# Patient Record
Sex: FEMALE | Race: WHITE | Employment: OTHER | URBAN - METROPOLITAN AREA
[De-identification: names, ages, dates, MRNs, and addresses within clinical notes are randomized per-mention and may not be internally consistent; named-entity substitution may affect disease eponyms.]

---

## 2019-12-11 ENCOUNTER — HOSPITAL ENCOUNTER (OUTPATIENT)
Dept: GENERAL RADIOLOGY | Age: 59
Discharge: HOME | End: 2019-12-11
Payer: COMMERCIAL

## 2019-12-11 DIAGNOSIS — Z78.0 ASYMPTOMATIC POSTMENOPAUSAL STATE: ICD-10-CM

## 2019-12-11 DIAGNOSIS — M54.2 CERVICALGIA: ICD-10-CM

## 2019-12-11 PROCEDURE — 77080 DXA BONE DENSITY AXIAL: CPT

## 2019-12-11 PROCEDURE — 72040 X-RAY EXAM NECK SPINE 2-3 VW: CPT

## 2021-09-29 ENCOUNTER — HOSPITAL ENCOUNTER (OUTPATIENT)
Dept: GENERAL RADIOLOGY | Age: 61
Discharge: HOME | End: 2021-09-29
Payer: COMMERCIAL

## 2021-09-29 ENCOUNTER — HOSPITAL ENCOUNTER (OUTPATIENT)
Dept: MAMMOGRAPHY | Age: 61
Discharge: HOME | End: 2021-09-29
Payer: COMMERCIAL

## 2021-09-29 VITALS — WEIGHT: 180 LBS | BODY MASS INDEX: 33.13 KG/M2 | HEIGHT: 62 IN

## 2021-09-29 DIAGNOSIS — M15.9 OSTEOARTHRITIS OF MULTIPLE JOINTS, UNSPECIFIED OSTEOARTHRITIS TYPE: ICD-10-CM

## 2021-09-29 DIAGNOSIS — Z12.31 ENCOUNTER FOR SCREENING MAMMOGRAM FOR BREAST CANCER: ICD-10-CM

## 2021-09-29 PROCEDURE — 77063 BREAST TOMOSYNTHESIS BI: CPT

## 2021-09-29 PROCEDURE — 73110 X-RAY EXAM OF WRIST: CPT

## 2022-02-08 ENCOUNTER — APPOINTMENT (OUTPATIENT)
Dept: CT IMAGING | Age: 62
End: 2022-02-08
Payer: COMMERCIAL

## 2022-02-08 ENCOUNTER — HOSPITAL ENCOUNTER (EMERGENCY)
Age: 62
Discharge: HOME | End: 2022-02-08
Attending: EMERGENCY MEDICINE
Payer: COMMERCIAL

## 2022-02-08 VITALS
RESPIRATION RATE: 16 BRPM | DIASTOLIC BLOOD PRESSURE: 80 MMHG | WEIGHT: 175 LBS | SYSTOLIC BLOOD PRESSURE: 151 MMHG | OXYGEN SATURATION: 96 % | BODY MASS INDEX: 32.01 KG/M2 | TEMPERATURE: 98.3 F | HEART RATE: 93 BPM

## 2022-02-08 DIAGNOSIS — S22.42XA CLOSED FRACTURE OF MULTIPLE RIBS OF LEFT SIDE, INITIAL ENCOUNTER: Primary | ICD-10-CM

## 2022-02-08 LAB
ANION GAP SERPL CALCULATED.3IONS-SCNC: 13 MMOL/L (ref 3–16)
BASOPHILS ABSOLUTE: 0.1 K/UL (ref 0–0.2)
BASOPHILS RELATIVE PERCENT: 1.1 %
BILIRUBIN URINE: NEGATIVE
BLOOD, URINE: NEGATIVE
BUN BLDV-MCNC: 21 MG/DL (ref 7–20)
CALCIUM SERPL-MCNC: 9.1 MG/DL (ref 8.3–10.6)
CHLORIDE BLD-SCNC: 100 MMOL/L (ref 99–110)
CLARITY: CLEAR
CO2: 26 MMOL/L (ref 21–32)
COLOR: YELLOW
CREAT SERPL-MCNC: <0.5 MG/DL (ref 0.6–1.2)
EOSINOPHILS ABSOLUTE: 0.1 K/UL (ref 0–0.6)
EOSINOPHILS RELATIVE PERCENT: 1.7 %
GFR AFRICAN AMERICAN: >60
GFR NON-AFRICAN AMERICAN: >60
GLUCOSE BLD-MCNC: 114 MG/DL (ref 70–99)
GLUCOSE URINE: NEGATIVE MG/DL
HCT VFR BLD CALC: 36.8 % (ref 36–48)
HEMOGLOBIN: 11.9 G/DL (ref 12–16)
KETONES, URINE: NEGATIVE MG/DL
LEUKOCYTE ESTERASE, URINE: NEGATIVE
LYMPHOCYTES ABSOLUTE: 1.4 K/UL (ref 1–5.1)
LYMPHOCYTES RELATIVE PERCENT: 18.5 %
MCH RBC QN AUTO: 27.5 PG (ref 26–34)
MCHC RBC AUTO-ENTMCNC: 32.4 G/DL (ref 31–36)
MCV RBC AUTO: 84.8 FL (ref 80–100)
MICROSCOPIC EXAMINATION: NORMAL
MONOCYTES ABSOLUTE: 0.7 K/UL (ref 0–1.3)
MONOCYTES RELATIVE PERCENT: 8.7 %
NEUTROPHILS ABSOLUTE: 5.4 K/UL (ref 1.7–7.7)
NEUTROPHILS RELATIVE PERCENT: 70 %
NITRITE, URINE: NEGATIVE
PDW BLD-RTO: 13.7 % (ref 12.4–15.4)
PH UA: 5.5 (ref 5–8)
PLATELET # BLD: 367 K/UL (ref 135–450)
PMV BLD AUTO: 6.7 FL (ref 5–10.5)
POTASSIUM REFLEX MAGNESIUM: 4.1 MMOL/L (ref 3.5–5.1)
PROTEIN UA: NEGATIVE MG/DL
RBC # BLD: 4.34 M/UL (ref 4–5.2)
REASON FOR REJECTION: NORMAL
REJECTED TEST: NORMAL
SODIUM BLD-SCNC: 139 MMOL/L (ref 136–145)
SPECIFIC GRAVITY UA: 1.03 (ref 1–1.03)
TROPONIN: <0.01 NG/ML
URINE REFLEX TO CULTURE: NORMAL
URINE TYPE: NORMAL
UROBILINOGEN, URINE: 0.2 E.U./DL
WBC # BLD: 7.7 K/UL (ref 4–11)

## 2022-02-08 PROCEDURE — 80048 BASIC METABOLIC PNL TOTAL CA: CPT

## 2022-02-08 PROCEDURE — 93005 ELECTROCARDIOGRAM TRACING: CPT | Performed by: EMERGENCY MEDICINE

## 2022-02-08 PROCEDURE — 6360000004 HC RX CONTRAST MEDICATION: Performed by: EMERGENCY MEDICINE

## 2022-02-08 PROCEDURE — 84484 ASSAY OF TROPONIN QUANT: CPT

## 2022-02-08 PROCEDURE — 6370000000 HC RX 637 (ALT 250 FOR IP): Performed by: EMERGENCY MEDICINE

## 2022-02-08 PROCEDURE — 85025 COMPLETE CBC W/AUTO DIFF WBC: CPT

## 2022-02-08 PROCEDURE — 36415 COLL VENOUS BLD VENIPUNCTURE: CPT

## 2022-02-08 PROCEDURE — 81003 URINALYSIS AUTO W/O SCOPE: CPT

## 2022-02-08 PROCEDURE — 74177 CT ABD & PELVIS W/CONTRAST: CPT

## 2022-02-08 PROCEDURE — 71260 CT THORAX DX C+: CPT

## 2022-02-08 PROCEDURE — 99283 EMERGENCY DEPT VISIT LOW MDM: CPT

## 2022-02-08 RX ORDER — ONDANSETRON 4 MG/1
4 TABLET, ORALLY DISINTEGRATING ORAL ONCE
Status: COMPLETED | OUTPATIENT
Start: 2022-02-08 | End: 2022-02-08

## 2022-02-08 RX ORDER — HYDROXYCHLOROQUINE SULFATE 200 MG/1
TABLET, FILM COATED ORAL DAILY
COMMUNITY

## 2022-02-08 RX ORDER — ONDANSETRON 4 MG/1
4 TABLET, FILM COATED ORAL DAILY PRN
Qty: 30 TABLET | Refills: 0 | Status: SHIPPED | OUTPATIENT
Start: 2022-02-08 | End: 2022-02-24 | Stop reason: SDUPTHER

## 2022-02-08 RX ORDER — OXYCODONE HYDROCHLORIDE AND ACETAMINOPHEN 5; 325 MG/1; MG/1
1 TABLET ORAL ONCE
Status: COMPLETED | OUTPATIENT
Start: 2022-02-08 | End: 2022-02-08

## 2022-02-08 RX ORDER — DULAGLUTIDE 0.75 MG/.5ML
1.5 INJECTION, SOLUTION SUBCUTANEOUS WEEKLY
COMMUNITY
End: 2022-03-30

## 2022-02-08 RX ORDER — DULOXETIN HYDROCHLORIDE 60 MG/1
60 CAPSULE, DELAYED RELEASE ORAL DAILY
COMMUNITY
End: 2022-09-12 | Stop reason: SDUPTHER

## 2022-02-08 RX ORDER — ONDANSETRON HYDROCHLORIDE 8 MG/1
8 TABLET, FILM COATED ORAL EVERY 8 HOURS PRN
COMMUNITY
End: 2022-02-24

## 2022-02-08 RX ORDER — HYDROCODONE BITARTRATE AND ACETAMINOPHEN 5; 325 MG/1; MG/1
1 TABLET ORAL EVERY 4 HOURS PRN
Qty: 18 TABLET | Refills: 0 | Status: SHIPPED | OUTPATIENT
Start: 2022-02-08 | End: 2022-02-11

## 2022-02-08 RX ORDER — PRAVASTATIN SODIUM 10 MG
10 TABLET ORAL DAILY
COMMUNITY
End: 2022-09-12 | Stop reason: SDUPTHER

## 2022-02-08 RX ORDER — MELOXICAM 7.5 MG/1
7.5 TABLET ORAL DAILY
Qty: 90 TABLET | Refills: 1 | Status: SHIPPED | OUTPATIENT
Start: 2022-02-08 | End: 2022-03-30

## 2022-02-08 RX ORDER — GABAPENTIN 300 MG/1
300 CAPSULE ORAL 2 TIMES DAILY
COMMUNITY
End: 2022-09-12 | Stop reason: SDUPTHER

## 2022-02-08 RX ORDER — GLIPIZIDE 10 MG/1
10 TABLET ORAL
COMMUNITY
End: 2022-03-30 | Stop reason: SDUPTHER

## 2022-02-08 RX ADMIN — OXYCODONE AND ACETAMINOPHEN 1 TABLET: 5; 325 TABLET ORAL at 07:11

## 2022-02-08 RX ADMIN — ONDANSETRON 4 MG: 4 TABLET, ORALLY DISINTEGRATING ORAL at 07:11

## 2022-02-08 RX ADMIN — IOPAMIDOL 75 ML: 755 INJECTION, SOLUTION INTRAVENOUS at 09:11

## 2022-02-08 ASSESSMENT — PAIN SCALES - GENERAL
PAINLEVEL_OUTOF10: 10
PAINLEVEL_OUTOF10: 10

## 2022-02-08 NOTE — ED NOTES
Discharge instructions and prescriptions reviewed with pt. Pt allowed opportunity to ask questions and verbalized understanding.       Mandi Taveras RN  02/08/22 0722 Pt placed in radiant warmer, temp set to 36.5 celsius.

## 2022-02-08 NOTE — ED NOTES
Bed: 09  Expected date:   Expected time:   Means of arrival:   Comments:  Shiva Caldera RN  02/08/22 48

## 2022-02-08 NOTE — ED PROVIDER NOTES
Emergency Department Encounter    Patient: Guerda Collado  MRN: [id number]  : 1960  Date of Evaluation: 2022  ED Provider:  Elias Mullen MD    Triage Chief Complaint:   Back Pain (pt states since  she has been sick with coughing. pt states hurts to take a deep breath. pt states hurts to move. )    [place]:  Guerda Collado is a 57 y.o. female that presents history of pleuritic pain left, was Covid negative antibiotic use of Augmentin, no steroids, utilization of inhalers has history of diabetes arthritis and fibromyalgia no known history of coronary disease. Severe left lateral chest wall pain possible mild trauma after walking her dog, but complained of severe cough and pleuritic chest pain left. Pain with range of motion. Left upper quadrant pain.     ROS - see HPI, below listed is current ROS at time of my eval:  General:  No fevers, no weakness  Eyes:  no discharge  ENT:  No sore throat, no nasal congestion  Cardiovascular:  + chest pain, no palpitations  Respiratory:  No shortness of breath, no cough, no wheezing  Gastrointestinal:  No pain, no nausea, no vomiting, no diarrhea  Musculoskeletal:  No muscle pain, no joint pain  Skin:  No rash, no pruritis  Neurologic:  No speech problems, no headache, no extremity numbness, no extremity tingling, no extremity weakness  Psychiatric:  No anxiety  Genitourinary:  No dysuria, no hematuria  Endocrine:  No unexpected weight gain, no unexpected weight loss  Extremities:  no edema, no pain    Past Medical History:   Diagnosis Date    Arthritis     Asthma     Diabetes mellitus ([facility] [address]     Diverticulitis     Fibromyalgia     GERD (gastroesophageal reflux disease)     IBS (irritable bowel syndrome)      Past Surgical History:   Procedure Laterality Date    COLONOSCOPY      HYSTERECTOMY      age 39    LAPAROSCOPY      OVARY REMOVAL      age 43     Family History   Problem Relation Age of Onset    Breast Cancer Paternal Aunt     Breast Cancer Other     Ovarian Cancer Other     Ovarian Cancer Other     Ovarian Cancer Other     Ovarian Cancer Other      Social History     Socioeconomic History    Marital status:      Spouse name: Not on file    Number of children: Not on file    Years of education: Not on file    Highest education level: Not on file   Occupational History    Not on file   Tobacco Use    Smoking status: Never Smoker    Smokeless tobacco: Never Used   Substance and Sexual Activity    Alcohol use: Yes     Comment: rare    Drug use: No    Sexual activity: Not on file   Other Topics Concern    Not on file   Social History Narrative    Not on file     Social Determinants of Health     Financial Resource Strain:     Difficulty of Paying Living Expenses: Not on file   Food Insecurity:     Worried About Running Out of Food in the Last Year: Not on file    Adrienne of Food in the Last Year: Not on file   Transportation Needs:     Lack of Transportation (Medical): Not on file    Lack of Transportation (Non-Medical):  Not on file   Physical Activity:     Days of Exercise per Week: Not on file    Minutes of Exercise per Session: Not on file   Stress:     Feeling of Stress : Not on file   Social Connections:     Frequency of Communication with Friends and Family: Not on file    Frequency of Social Gatherings with Friends and Family: Not on file    Attends Taoist Services: Not on file    Active Member of [address] [place] [address] or Organizations: Not on file    Attends Club or Organization Meetings: Not on file    Marital Status: Not on file   Intimate Partner Violence:     Fear of Current or Ex-Partner: Not on file    Emotionally Abused: Not on file    Physically Abused: Not on file    Sexually Abused: Not on file   Housing Stability:     Unable to Pay for Housing in the Last Year: Not on file    Number of [place] in the Last Year: Not on file    Unstable Housing in the Last Year: Not on file     No current facility-administered medications for this encounter. Current Outpatient Medications   Medication Sig Dispense Refill    Dulaglutide (TRULICITY) 3.02 CI/6.6GN SOPN Inject 0.75 mg into the skin once a week      gabapentin (NEURONTIN) 300 MG capsule Take 300 mg by mouth 3 times daily.       glipiZIDE (GLUCOTROL) 10 MG tablet Take 10 mg by mouth 2 times daily (before meals)      DULoxetine (CYMBALTA) 60 MG extended release capsule Take 60 mg by mouth daily      ondansetron (ZOFRAN) 8 MG tablet Take 8 mg by mouth every 8 hours as needed for Nausea or Vomiting      hydroxychloroquine (PLAQUENIL) 200 MG tablet Take by mouth daily      pravastatin (PRAVACHOL) 10 MG tablet Take 10 mg by mouth daily      metFORMIN (GLUCOPHAGE-XR) 750 MG extended release tablet Take 750 mg by mouth 2 times daily      lisinopril (PRINIVIL;ZESTRIL) 20 MG tablet Take 20 mg by mouth daily ? dose      levothyroxine (SYNTHROID) 75 MCG tablet Take 75 mcg by mouth Daily      lansoprazole (PREVACID) 30 MG delayed release capsule Take 30 mg by mouth as needed      albuterol sulfate  (90 Base) MCG/ACT inhaler Inhale 2 puffs into the lungs every 6 hours as needed for Wheezing      budesonide-formoterol (SYMBICORT) 160-4.5 MCG/ACT AERO Inhale 2 puffs into the lungs 2 times daily      montelukast (SINGULAIR) 10 MG tablet Take 10 mg by mouth nightly      ibuprofen (ADVIL;MOTRIN) 800 MG tablet Take 1 tablet by mouth every 8 hours as needed for Pain or Fever 20 tablet 0     Allergies   Allergen Reactions    Latex      Gloves cause skin irritation and asthma       Nursing Notes Reviewed    Physical Exam:  Triage VS:    ED Triage Vitals   Enc Vitals Group      BP 02/08/22 0605 (!) 151/80      Pulse 02/08/22 0605 93      Resp 02/08/22 0605 16      Temp 02/08/22 0605 98.3 °F (36.8 °C)      Temp Source 02/08/22 0605 Oral      SpO2 02/08/22 0605 96 %      Weight 02/08/22 0608 175 lb (79.4 kg)      Height --       Head Circumference -- Peak Flow --       Pain Score --       Pain Loc --       Pain Edu? --       Excl. in [address]? --        My pulse ox interpretation is - normal    General appearance:  No acute distress. Skin:  Warm. Dry. Eye:  Extraocular movements intact. Ears, nose, mouth and throat:  Oral mucosa moist   Neck:  Trachea midline. Extremity:  No swelling. Normal ROM     Heart:  Regular rate and rhythm, normal S1 & S2, no extra heart sounds. Perfusion:  intact, left chest wall tenderness  Respiratory:  Lungs clear to auscultation bilaterally. Respirations nonlabored. Abdominal:  Normal bowel sounds. Soft. Nontender. Non distended. Neurological:  Alert and oriented times 3.              Psychiatric:  Appropriate    I have reviewed and interpreted all of the currently available lab results from this visit (if applicable):  Results for orders placed or performed during the hospital encounter of 02/08/22   CBC Auto Differential   Result Value Ref Range    WBC 7.7 4.0 - 11.0 K/uL    RBC 4.34 4.00 - 5.20 M/uL    Hemoglobin 11.9 (L) 12.0 - 16.0 g/dL    Hematocrit 36.8 36.0 - 48.0 %    MCV 84.8 80.0 - 100.0 fL    MCH 27.5 26.0 - 34.0 pg    MCHC 32.4 31.0 - 36.0 g/dL    RDW 13.7 12.4 - 15.4 %    Platelets 601 993 - 666 K/uL    MPV 6.7 5.0 - 10.5 fL    Neutrophils % 70.0 %    Lymphocytes % 18.5 %    Monocytes % 8.7 %    Eosinophils % 1.7 %    Basophils % 1.1 %    Neutrophils Absolute 5.4 1.7 - 7.7 K/uL    Lymphocytes Absolute 1.4 1.0 - 5.1 K/uL    Monocytes Absolute 0.7 0.0 - 1.3 K/uL    Eosinophils Absolute 0.1 0.0 - 0.6 K/uL    Basophils Absolute 0.1 0.0 - 0.2 K/uL   SPECIMEN REJECTION   Result Value Ref Range    Rejected Test bmpx,trop     Reason for Rejection see below    Basic Metabolic Panel w/ Reflex to MG   Result Value Ref Range    Sodium 139 136 - 145 mmol/L    Potassium reflex Magnesium 4.1 3.5 - 5.1 mmol/L    Chloride 100 99 - 110 mmol/L    CO2 26 21 - 32 mmol/L    Anion Gap 13 3 - 16    Glucose 114 (H) 70 - 99 mg/dL    BUN 21 (H) 7 - 20 mg/dL    CREATININE <0.5 (L) 0.6 - 1.2 mg/dL    GFR Non-African American >60 >60    GFR African American >60 >60    Calcium 9.1 8.3 - 10.6 mg/dL   Troponin   Result Value Ref Range    Troponin <0.01 <0.01 ng/mL   Urine, reflex to culture    Specimen: Urine, clean catch   Result Value Ref Range    Color, UA YELLOW Straw/Yellow    Clarity, UA Clear Clear    Glucose, Ur Negative Negative mg/dL    Bilirubin Urine Negative Negative    Ketones, Urine Negative Negative mg/dL    Specific Gravity, UA 1.027 1.005 - 1.030    Blood, Urine Negative Negative    pH, UA 5.5 5.0 - 8.0    Protein, UA Negative Negative mg/dL    Urobilinogen, Urine 0.2 <2.0 E.U./dL    Nitrite, Urine Negative Negative    Leukocyte Esterase, Urine Negative Negative    Microscopic Examination Not Indicated     Urine Type Voided     Urine Reflex to Culture Not Indicated    EKG 12 Lead   Result Value Ref Range    Ventricular Rate 77 BPM    Atrial Rate 77 BPM    P-R Interval 130 ms    QRS Duration 86 ms    Q-T Interval 400 ms    QTc Calculation (Bazett) 452 ms    P Axis 37 degrees    R Axis 14 degrees    T Axis 23 degrees    Diagnosis Normal sinus rhythmNormal ECG       Radiographs (if obtained):  Radiologist's Report Reviewed:  Fractures 8 9, no pneumothorax no hemothorax, resolving pneumonia and lymphadenopathy      EKG (if obtained): (All EKG's are interpreted by myself in the absence of a cardiologist)  Sinus rhythm, normal axis, normal intervals, no acute ST segment abnormalities    MDM:  Patient presented with chest pain. Given history and presentation cardiac workup initiated. Patient had labs, EKG, x-ray and troponin ordered. Patient was placed on monitor. Patient's pulse ox is normal.      HEART Score:   3    Patient's chest x-ray is read by radiology as negative for acute abnormality    The patient's initial troponin is within the normal range. Patient's EKG did not show any acute diagnostic ischemic changes.   The patient was given medications, is starting to feel better. Patient does not have any acute hemodynamic instability. I completed a HEART PATHWAY to screen for Acute Coronary Syndrome (ACS) in this patient. The evidence indicates that the patient is very low risk for ACS and this is consistent with my clinical intuition. The risk of further workup or hospitalization is likely higher than the risk of the patient having an ACS. It is, therefore, in the patients best interest not to do additional emergent testing or be hospitalized    Patient has rib fractures without evidence of pneumothorax or hemothorax, no solid organ injury no splenic injury. Analgesia activity as tolerated return if worse or new symptoms, resolving pneumonia without hypoxia. Clinical Impression:  1. Closed fracture of multiple ribs of left side, initial encounter      Disposition referral (if applicable):  Primary MD          Disposition medications (if applicable):  New Prescriptions    No medications on file       Comment: Please note this report has been produced using speech recognition software and may contain errors related to that system including errors in grammar, punctuation, and spelling, as well as words and phrases that may be inappropriate. Efforts were made to edit the dictations.       Hernan Ortiz MD  17/14/81 8403

## 2022-02-10 LAB
EKG ATRIAL RATE: 77 BPM
EKG DIAGNOSIS: NORMAL
EKG P AXIS: 37 DEGREES
EKG P-R INTERVAL: 130 MS
EKG Q-T INTERVAL: 400 MS
EKG QRS DURATION: 86 MS
EKG QTC CALCULATION (BAZETT): 452 MS
EKG R AXIS: 14 DEGREES
EKG T AXIS: 23 DEGREES
EKG VENTRICULAR RATE: 77 BPM

## 2022-02-10 PROCEDURE — 93010 ELECTROCARDIOGRAM REPORT: CPT | Performed by: INTERNAL MEDICINE

## 2022-02-24 ENCOUNTER — OFFICE VISIT (OUTPATIENT)
Dept: PRIMARY CARE CLINIC | Age: 62
End: 2022-02-24
Payer: COMMERCIAL

## 2022-02-24 VITALS
SYSTOLIC BLOOD PRESSURE: 115 MMHG | RESPIRATION RATE: 13 BRPM | HEART RATE: 103 BPM | OXYGEN SATURATION: 96 % | TEMPERATURE: 97.2 F | BODY MASS INDEX: 32.76 KG/M2 | HEIGHT: 62 IN | DIASTOLIC BLOOD PRESSURE: 75 MMHG | WEIGHT: 178 LBS

## 2022-02-24 DIAGNOSIS — R41.3 MEMORY PROBLEM: ICD-10-CM

## 2022-02-24 DIAGNOSIS — E11.9 TYPE 2 DIABETES MELLITUS WITHOUT COMPLICATION, WITHOUT LONG-TERM CURRENT USE OF INSULIN (HCC): Primary | ICD-10-CM

## 2022-02-24 DIAGNOSIS — I10 HYPERTENSION, ESSENTIAL: ICD-10-CM

## 2022-02-24 DIAGNOSIS — M79.7 FIBROMYALGIA: ICD-10-CM

## 2022-02-24 DIAGNOSIS — Z00.00 ROUTINE ADULT HEALTH MAINTENANCE: ICD-10-CM

## 2022-02-24 DIAGNOSIS — S22.42XD CLOSED FRACTURE OF MULTIPLE RIBS OF LEFT SIDE WITH ROUTINE HEALING, SUBSEQUENT ENCOUNTER: ICD-10-CM

## 2022-02-24 DIAGNOSIS — M72.0 DUPUYTREN CONTRACTURE: ICD-10-CM

## 2022-02-24 DIAGNOSIS — Z79.51 LONG TERM (CURRENT) USE OF INHALED STEROIDS: ICD-10-CM

## 2022-02-24 DIAGNOSIS — E78.2 MIXED HYPERLIPIDEMIA: ICD-10-CM

## 2022-02-24 DIAGNOSIS — Z13.820 OSTEOPOROSIS SCREENING: ICD-10-CM

## 2022-02-24 DIAGNOSIS — K58.0 IRRITABLE BOWEL SYNDROME WITH DIARRHEA: ICD-10-CM

## 2022-02-24 DIAGNOSIS — J45.30 MILD PERSISTENT ASTHMA, UNSPECIFIED WHETHER COMPLICATED: ICD-10-CM

## 2022-02-24 DIAGNOSIS — R19.7 DIARRHEA, UNSPECIFIED TYPE: ICD-10-CM

## 2022-02-24 DIAGNOSIS — E03.9 HYPOTHYROIDISM, UNSPECIFIED TYPE: ICD-10-CM

## 2022-02-24 DIAGNOSIS — R91.8 PULMONARY NODULES: ICD-10-CM

## 2022-02-24 PROCEDURE — 3044F HG A1C LEVEL LT 7.0%: CPT | Performed by: INTERNAL MEDICINE

## 2022-02-24 PROCEDURE — 3017F COLORECTAL CA SCREEN DOC REV: CPT | Performed by: INTERNAL MEDICINE

## 2022-02-24 PROCEDURE — G8484 FLU IMMUNIZE NO ADMIN: HCPCS | Performed by: INTERNAL MEDICINE

## 2022-02-24 PROCEDURE — G8427 DOCREV CUR MEDS BY ELIG CLIN: HCPCS | Performed by: INTERNAL MEDICINE

## 2022-02-24 PROCEDURE — 2022F DILAT RTA XM EVC RTNOPTHY: CPT | Performed by: INTERNAL MEDICINE

## 2022-02-24 PROCEDURE — 99203 OFFICE O/P NEW LOW 30 MIN: CPT | Performed by: INTERNAL MEDICINE

## 2022-02-24 PROCEDURE — G8417 CALC BMI ABV UP PARAM F/U: HCPCS | Performed by: INTERNAL MEDICINE

## 2022-02-24 PROCEDURE — 1036F TOBACCO NON-USER: CPT | Performed by: INTERNAL MEDICINE

## 2022-02-24 RX ORDER — LANCETS 33 GAUGE
EACH MISCELLANEOUS
COMMUNITY
Start: 2022-01-26

## 2022-02-24 RX ORDER — NYSTATIN 100000 U/G
CREAM TOPICAL PRN
COMMUNITY
Start: 2021-12-07 | End: 2022-03-30

## 2022-02-24 RX ORDER — ONDANSETRON HYDROCHLORIDE 8 MG/1
8 TABLET, FILM COATED ORAL DAILY PRN
Qty: 30 TABLET | Refills: 0 | Status: SHIPPED | OUTPATIENT
Start: 2022-02-24 | End: 2022-03-30

## 2022-02-24 RX ORDER — SUCRALFATE 1 G/1
1 TABLET ORAL 4 TIMES DAILY
Qty: 120 TABLET | Refills: 0 | Status: SHIPPED | OUTPATIENT
Start: 2022-02-24 | End: 2022-03-14

## 2022-02-24 RX ORDER — ERGOCALCIFEROL 1.25 MG/1
CAPSULE ORAL WEEKLY
COMMUNITY
Start: 2021-12-07 | End: 2022-06-01 | Stop reason: SDUPTHER

## 2022-02-24 RX ORDER — BLOOD SUGAR DIAGNOSTIC
STRIP MISCELLANEOUS
COMMUNITY
Start: 2022-01-21

## 2022-02-24 RX ORDER — FLUTICASONE PROPIONATE 50 MCG
SPRAY, SUSPENSION (ML) NASAL DAILY
COMMUNITY
Start: 2021-12-07 | End: 2022-09-12 | Stop reason: SDUPTHER

## 2022-02-24 RX ORDER — PANTOPRAZOLE SODIUM 40 MG/1
40 TABLET, DELAYED RELEASE ORAL
Qty: 90 TABLET | Refills: 1 | Status: SHIPPED | OUTPATIENT
Start: 2022-02-24 | End: 2022-09-12 | Stop reason: SDUPTHER

## 2022-02-24 SDOH — ECONOMIC STABILITY: FOOD INSECURITY: WITHIN THE PAST 12 MONTHS, YOU WORRIED THAT YOUR FOOD WOULD RUN OUT BEFORE YOU GOT MONEY TO BUY MORE.: NEVER TRUE

## 2022-02-24 SDOH — ECONOMIC STABILITY: FOOD INSECURITY: WITHIN THE PAST 12 MONTHS, THE FOOD YOU BOUGHT JUST DIDN'T LAST AND YOU DIDN'T HAVE MONEY TO GET MORE.: NEVER TRUE

## 2022-02-24 ASSESSMENT — SOCIAL DETERMINANTS OF HEALTH (SDOH): HOW HARD IS IT FOR YOU TO PAY FOR THE VERY BASICS LIKE FOOD, HOUSING, MEDICAL CARE, AND HEATING?: NOT HARD AT ALL

## 2022-02-24 ASSESSMENT — PATIENT HEALTH QUESTIONNAIRE - PHQ9
SUM OF ALL RESPONSES TO PHQ QUESTIONS 1-9: 2
2. FEELING DOWN, DEPRESSED OR HOPELESS: 1
1. LITTLE INTEREST OR PLEASURE IN DOING THINGS: 1
SUM OF ALL RESPONSES TO PHQ QUESTIONS 1-9: 2
SUM OF ALL RESPONSES TO PHQ QUESTIONS 1-9: 2
SUM OF ALL RESPONSES TO PHQ9 QUESTIONS 1 & 2: 2
SUM OF ALL RESPONSES TO PHQ QUESTIONS 1-9: 2

## 2022-02-24 NOTE — PATIENT INSTRUCTIONS
1. Stool tst  2. Trial carafate to help stomach knot  3. Fasting blood and urine test  4. DEXA scan  5.  Eye exam

## 2022-02-24 NOTE — PROGRESS NOTES
pain, sore throat and trouble swallowing. Eyes: Negative for pain and visual disturbance. Respiratory: Negative for apnea, cough, chest tightness, shortness of breath and wheezing. Cardiovascular: Negative for chest pain and palpitations. Gastrointestinal: Negative for abdominal pain, blood in stool, constipation,  nausea, rectal pain and vomiting. Endocrine: Negative for cold intolerance, heat intolerance, polydipsia, polyphagia and polyuria. Genitourinary: Negative for difficulty urinating, dysuria, flank pain, frequency, hematuria, pelvic pain, urgency, vaginal bleeding and vaginal discharge. Musculoskeletal: Negative for arthralgias, back pain, gait problem, joint swelling, myalgias, neck pain and neck stiffness. Skin: Negative for color change and rash. Neurological: Negative for dizziness, tremors, syncope, speech difficulty, weakness, light-headedness and headaches. Hematological: Negative for adenopathy. Does not bruise/bleed easily. Psychiatric/Behavioral: Negative for agitation, behavioral problems, decreased concentration, sleep disturbance and suicidal ideas. The patient is not nervous/anxious and is not hyperactive. Prior to Visit Medications    Medication Sig Taking?  Authorizing Provider   vitamin D (ERGOCALCIFEROL) 1.25 MG (03179 UT) CAPS capsule once a week Yes Historical Provider, MD   BREO ELLIPTA 200-25 MCG/INH AEPB inhaler INHALE 1 PUFF BY MOUTH ONCE A DAY Yes Historical Provider, MD   fluticasone (FLONASE) 50 MCG/ACT nasal spray daily Yes Historical Provider, MD   nystatin (MYCOSTATIN) 426482 UNIT/GM cream as needed Yes Historical Provider, MD   ONETOUCH ULTRA strip USE TO TEST BLOOD SUGAR TWICE DAILY Yes Historical Provider, MD Carranza Delica Lancets 50T MISC USE TO TEST BLOOD SUGAR TWICE DAILY Yes Historical Provider, MD   metFORMIN (GLUCOPHAGE) 1000 MG tablet 2 times daily (with meals) Yes Historical Provider, MD   pantoprazole (PROTONIX) 40 MG tablet Take 1 tablet by mouth every morning (before breakfast) Yes Cinthia Blackwood MD   ondansetron (ZOFRAN) 8 MG tablet Take 1 tablet by mouth daily as needed for Nausea or Vomiting Yes Cinthia Blackwood MD   sucralfate (CARAFATE) 1 GM tablet Take 1 tablet by mouth 4 times daily Yes Cinthia Blackwood MD   Dulaglutide (TRULICITY) 1.42 BJ/8.2EP SOPN Inject 1.5 mg into the skin once a week  Yes Historical Provider, MD   gabapentin (NEURONTIN) 300 MG capsule Take 300 mg by mouth 2 times daily.   Yes Historical Provider, MD   glipiZIDE (GLUCOTROL) 10 MG tablet Take 10 mg by mouth 2 times daily (before meals) Yes Historical Provider, MD   DULoxetine (CYMBALTA) 60 MG extended release capsule Take 60 mg by mouth daily Yes Historical Provider, MD   hydroxychloroquine (PLAQUENIL) 200 MG tablet Take by mouth daily Yes Historical Provider, MD   pravastatin (PRAVACHOL) 10 MG tablet Take 10 mg by mouth daily Yes Historical Provider, MD   meloxicam (MOBIC) 7.5 MG tablet Take 1 tablet by mouth daily Yes Estefanía Garcia MD   lisinopril (PRINIVIL;ZESTRIL) 20 MG tablet Take 20 mg by mouth daily ? dose Yes Historical Provider, MD   levothyroxine (SYNTHROID) 75 MCG tablet Take 75 mcg by mouth Daily Yes Historical Provider, MD   albuterol sulfate  (90 Base) MCG/ACT inhaler Inhale 2 puffs into the lungs every 6 hours as needed for Wheezing Yes Historical Provider, MD   montelukast (SINGULAIR) 10 MG tablet Take 10 mg by mouth nightly Yes Historical Provider, MD   lansoprazole (PREVACID) 30 MG delayed release capsule Take 30 mg by mouth as needed Yes Historical Provider, MD   ibuprofen (ADVIL;MOTRIN) 800 MG tablet Take 1 tablet by mouth every 8 hours as needed for Pain or Fever Yes ELMER Mcintyre CNP   budesonide-formoterol (SYMBICORT) 160-4.5 MCG/ACT AERO Inhale 2 puffs into the lungs 2 times daily  Patient not taking: Reported on 2/24/2022  Historical Provider, MD        Allergies   Allergen Reactions    Latex      Gloves cause skin irritation and asthma       Past Medical History:   Diagnosis Date    Arthritis     Asthma     Diabetes mellitus ([facility] [address]     Diverticulitis     Fibromyalgia     GERD (gastroesophageal reflux disease)     IBS (irritable bowel syndrome)        Past Surgical History:   Procedure Laterality Date    COLONOSCOPY      HYSTERECTOMY      age 39    LAPAROSCOPY      OVARY REMOVAL      age 43       Social History     Socioeconomic History    Marital status:      Spouse name: Not on file    Number of children: Not on file    Years of education: Not on file    Highest education level: Not on file   Occupational History    Not on file   Tobacco Use    Smoking status: Never Smoker    Smokeless tobacco: Never Used   Substance and Sexual Activity    Alcohol use: Yes     Comment: rare    Drug use: No    Sexual activity: Not on file   Other Topics Concern    Not on file   Social History Narrative    Not on file     Social Determinants of Health     Financial Resource Strain: Low Risk     Difficulty of Paying Living Expenses: Not hard at all   Food Insecurity: No Food Insecurity    Worried About Running Out of Food in the Last Year: Never true    Adrienne of Food in the Last Year: Never true   Transportation Needs:     Lack of Transportation (Medical): Not on file    Lack of Transportation (Non-Medical):  Not on file   Physical Activity:     Days of Exercise per Week: Not on file    Minutes of Exercise per Session: Not on file   Stress:     Feeling of Stress : Not on file   Social Connections:     Frequency of Communication with Friends and Family: Not on file    Frequency of Social Gatherings with Friends and Family: Not on file    Attends Mormon Services: Not on file    Active Member of Clubs or Organizations: Not on file    Attends Club or Organization Meetings: Not on file    Marital Status: Not on file   Intimate Partner Violence:     Fear of Current or Ex-Partner: Not on file   Jeni Hernandez Emotionally Abused: Not on file    Physically Abused: Not on file    Sexually Abused: Not on file   Housing Stability:     Unable to Pay for Housing in the Last Year: Not on file    Number of [place] in the Last Year: Not on file    Unstable Housing in the Last Year: Not on file        Family History   Problem Relation Age of Onset    Breast Cancer Paternal Aunt     Breast Cancer Other     Ovarian Cancer Other     Ovarian Cancer Other     Ovarian Cancer Other     Ovarian Cancer Other        Vitals:    02/24/22 1020   BP: 115/75   Site: Left Upper Arm   Position: Sitting   Cuff Size: Large Adult   Pulse: 103   Resp: 13   Temp: 97.2 °F (36.2 °C)   TempSrc: Temporal   SpO2: 96%   Weight: 178 lb (80.7 kg)   Height: 5' 2\" (1.575 m)     Estimated body mass index is 32.56 kg/m² as calculated from the following:    Height as of this encounter: 5' 2\" (1.575 m). Weight as of this encounter: 178 lb (80.7 kg). PHYSICAL EXAM  GENERAL:  Pleasant  female who looks her stated age, awake alert and oriented x3, no acute distress. LUNGS:  Clear to auscultation bilaterally. Excellent air entry. No inspiratory crackles or expiratory wheezes. No Pleural friction rub. HEART:  Regular rate and rhythm without pathologic murmur rub gallop S3 or S4. ABDOMEN:  Soft, nontender. Normal bowel sounds. No guarding. No masses. Extremities: Bilateral palmar Dupuytren contractures. No stocking paresthesia or diabetic foot ulcers. Adequate circulation 2+ pulses. Scant distal lower extremity edema. PSYCH:  No psychomotor retardation or agitation. Good eye contact. Unrestricted affect range. Mood congruent with affect. Garrulous, occasionally tangential thought.       LABS  Lab Review   Admission on 02/08/2022, Discharged on 02/08/2022   Component Date Value    Ventricular Rate 02/08/2022 77     Atrial Rate 02/08/2022 77     P-R Interval 02/08/2022 130     QRS Duration 02/08/2022 86     Q-T Interval lung exam reassuring. DEXA scan 2019 T score -1.0 spine, -0.5 worst hip. Though unlikely to see interval progression to osteoporosis, patient and rheumatology asking for updated DEXA scan, not unreasonable as it has been 3 years  - Vitamin D 25 Hydroxy; Future  - DEXA BONE DENSITY AXIAL SKELETON; Future    7. Osteoporosis screening  See #6 above  - Vitamin D 25 Hydroxy; Future  - DEXA BONE DENSITY AXIAL SKELETON; Future    8. Hypothyroidism, unspecified type  TSH 1.9 December 8, 2021. Confirm compliance and adequacy of replacement. No symptoms of over or under replacement other than worsening diarrhea, memory loss, for which other explanations exist.    - TSH with Reflex; Future    9. Mixed hyperlipidemia  Though not in patient chart, patient on low-dose pravastatin, suspect statin myalgia for concern for such given pre-existing fibromyalgia. - Lipid Panel; Future  - AST; Future  - ALT; Future    10. Routine adult health maintenance  Only [facility] primary series, check status Tdap COVID booster influenza Shingrix. Inquire as to date of last colonoscopy (after diverticulitis), mammogram, status post hysterectomy therefore no Pap.    - Hep C AB RLFX HCV PCR-A; Future  -Vit D    11. Body mass index (BMI) 32.0-32.9, adult   - Vitamin D 25 Hydroxy; Future    12. Long term (current) use of inhaled steroids   Not currently using Symbicort but has in the past.  - DEXA BONE DENSITY AXIAL SKELETON; Future    13. Memory problem  Garrel Essex on return. 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 Non-smoker, nondrinker. No apparent history of sleep apnea. Inquire regarding reason for MCFP, family history dementia    15. Fibromyalgia  Followed in rheumatology. Only briefly on Plaquenil. Do not have results of any rheumatology work-up other than normal sed rate December 2021.   Currently on Cymbalta 60 mg at bedtime by gabapentin 300 twice daily. 15. Pulmonary nodules  February 8, 2022 chest CT. Mild bilateral hilar lymphadenopathy, precarinal lymph node, patchy infiltrate left upper and lower lobes presumed remnant of prior pneumonia. NonSmoker, 3 to 6 mm nodules left  upper lobe, for which obtain follow-up CT in 3 months, May 2022, repeat in 18 months if indicated. 16. Dupuytren contracture  Seeing a hand surgeon. 17.  Dyspepsia. Chronic NSAID use. Discomfort despite prior PPI, no EtOH. No melena or solid food dysphagia. Not taking Mobic and ibuprofen concurrently. Asks for refill for Zofran. Wonder about H. pylori, Metformin and or Trulicity side effect. Restart PPI, add Carafate short trial.     ?EGD    Return in about 4 weeks (around 3/24/2022). It was a pleasure to visit with Ms. Allyson Quick today. Answered all questions as best I could.   Reyes Tabares MD   Time 40 minutes

## 2022-02-25 DIAGNOSIS — S22.42XD CLOSED FRACTURE OF MULTIPLE RIBS OF LEFT SIDE WITH ROUTINE HEALING, SUBSEQUENT ENCOUNTER: ICD-10-CM

## 2022-02-25 DIAGNOSIS — Z13.820 OSTEOPOROSIS SCREENING: ICD-10-CM

## 2022-02-25 DIAGNOSIS — E11.9 TYPE 2 DIABETES MELLITUS WITHOUT COMPLICATION, WITHOUT LONG-TERM CURRENT USE OF INSULIN (HCC): ICD-10-CM

## 2022-02-25 DIAGNOSIS — E78.2 MIXED HYPERLIPIDEMIA: ICD-10-CM

## 2022-02-25 DIAGNOSIS — I10 HYPERTENSION, ESSENTIAL: ICD-10-CM

## 2022-02-25 DIAGNOSIS — E03.9 HYPOTHYROIDISM, UNSPECIFIED TYPE: ICD-10-CM

## 2022-02-25 LAB
ALT SERPL-CCNC: 16 U/L (ref 10–40)
ANION GAP SERPL CALCULATED.3IONS-SCNC: 15 MMOL/L (ref 3–16)
AST SERPL-CCNC: 16 U/L (ref 15–37)
BUN BLDV-MCNC: 18 MG/DL (ref 7–20)
CALCIUM SERPL-MCNC: 9.3 MG/DL (ref 8.3–10.6)
CHLORIDE BLD-SCNC: 102 MMOL/L (ref 99–110)
CHOLESTEROL, TOTAL: 156 MG/DL (ref 0–199)
CO2: 23 MMOL/L (ref 21–32)
CREAT SERPL-MCNC: 0.5 MG/DL (ref 0.6–1.2)
GFR AFRICAN AMERICAN: >60
GFR NON-AFRICAN AMERICAN: >60
GLUCOSE BLD-MCNC: 117 MG/DL (ref 70–99)
HDLC SERPL-MCNC: 58 MG/DL (ref 40–60)
LDL CHOLESTEROL CALCULATED: 84 MG/DL
POTASSIUM SERPL-SCNC: 4.1 MMOL/L (ref 3.5–5.1)
SODIUM BLD-SCNC: 140 MMOL/L (ref 136–145)
TRIGL SERPL-MCNC: 71 MG/DL (ref 0–150)
TSH REFLEX: 1.55 UIU/ML (ref 0.27–4.2)
VITAMIN D 25-HYDROXY: 47.5 NG/ML
VLDLC SERPL CALC-MCNC: 14 MG/DL

## 2022-02-25 RX ORDER — SUCRALFATE 1 G/1
TABLET ORAL
Qty: 360 TABLET | OUTPATIENT
Start: 2022-02-25

## 2022-02-25 NOTE — TELEPHONE ENCOUNTER
Medication:   Requested Prescriptions     Pending Prescriptions Disp Refills    sucralfate (CARAFATE) 1 GM tablet [Pharmacy Med Name: SUCRALFATE 1GM TABLETS] 360 tablet      Sig: TAKE 1 TABLET BY MOUTH FOUR TIMES DAILY        Last Filled:      Patient Phone Number: [phone] (home)     Last appt: 2/24/2022   Next appt: 3/30/2022    Last OARRS: No flowsheet data found.

## 2022-02-25 NOTE — TELEPHONE ENCOUNTER
Inform patient that I gave her a short trial to see if it helps before providing larger prescription

## 2022-02-26 ENCOUNTER — HOSPITAL ENCOUNTER (OUTPATIENT)
Age: 62
Discharge: HOME | End: 2022-02-26
Payer: COMMERCIAL

## 2022-02-26 LAB
CREATININE URINE: 69.3 MG/DL (ref 28–259)
ESTIMATED AVERAGE GLUCOSE: 145.6 MG/DL
HBA1C MFR BLD: 6.7 %
MICROALBUMIN UR-MCNC: <1.2 MG/DL
MICROALBUMIN/CREAT UR-RTO: NORMAL MG/G (ref 0–30)

## 2022-02-26 PROCEDURE — 82043 UR ALBUMIN QUANTITATIVE: CPT

## 2022-02-26 PROCEDURE — 82570 ASSAY OF URINE CREATININE: CPT

## 2022-02-26 RX ORDER — SUCRALFATE 1 G/1
TABLET ORAL
Qty: 360 TABLET | OUTPATIENT
Start: 2022-02-26

## 2022-02-28 ENCOUNTER — HOSPITAL ENCOUNTER (OUTPATIENT)
Age: 62
Discharge: HOME | End: 2022-02-28
Payer: COMMERCIAL

## 2022-02-28 LAB — C DIFF TOXIN/ANTIGEN: NORMAL

## 2022-02-28 PROCEDURE — 87449 NOS EACH ORGANISM AG IA: CPT

## 2022-02-28 PROCEDURE — 87324 CLOSTRIDIUM AG IA: CPT

## 2022-03-08 ENCOUNTER — HOSPITAL ENCOUNTER (OUTPATIENT)
Dept: GENERAL RADIOLOGY | Age: 62
Discharge: HOME | End: 2022-03-08
Payer: COMMERCIAL

## 2022-03-08 DIAGNOSIS — Z79.51 LONG TERM (CURRENT) USE OF INHALED STEROIDS: ICD-10-CM

## 2022-03-08 DIAGNOSIS — S22.42XD CLOSED FRACTURE OF MULTIPLE RIBS OF LEFT SIDE WITH ROUTINE HEALING, SUBSEQUENT ENCOUNTER: ICD-10-CM

## 2022-03-08 DIAGNOSIS — Z13.820 OSTEOPOROSIS SCREENING: ICD-10-CM

## 2022-03-08 PROCEDURE — 77080 DXA BONE DENSITY AXIAL: CPT

## 2022-03-14 RX ORDER — SUCRALFATE ORAL 1 G/10ML
1 SUSPENSION ORAL
Qty: 900 ML | Refills: 1 | Status: SHIPPED | OUTPATIENT
Start: 2022-03-14 | End: 2022-06-01

## 2022-03-15 ENCOUNTER — TELEPHONE (OUTPATIENT)
Dept: ADMINISTRATIVE | Age: 62
End: 2022-03-15

## 2022-03-21 NOTE — TELEPHONE ENCOUNTER
Submitted PA for Sucralfate 1GM/10ML suspension. Via CMM (Key: [id number] STATUS: APPROVED effective 3/18/2022-3/18/2023     Please notify patient.  Thank you

## 2022-03-30 ENCOUNTER — TELEPHONE (OUTPATIENT)
Dept: PRIMARY CARE CLINIC | Age: 62
End: 2022-03-30

## 2022-03-30 ENCOUNTER — OFFICE VISIT (OUTPATIENT)
Dept: PRIMARY CARE CLINIC | Age: 62
End: 2022-03-30
Payer: COMMERCIAL

## 2022-03-30 VITALS
HEIGHT: 62 IN | WEIGHT: 180 LBS | SYSTOLIC BLOOD PRESSURE: 143 MMHG | HEART RATE: 87 BPM | BODY MASS INDEX: 33.13 KG/M2 | OXYGEN SATURATION: 98 % | DIASTOLIC BLOOD PRESSURE: 82 MMHG | TEMPERATURE: 97.2 F

## 2022-03-30 DIAGNOSIS — R91.8 MULTIPLE PULMONARY NODULES: Primary | ICD-10-CM

## 2022-03-30 DIAGNOSIS — S30.860A TICK BITE OF LOWER BACK, INITIAL ENCOUNTER: ICD-10-CM
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
## 2022-03-30 PROCEDURE — 1036F TOBACCO NON-USER: CPT | Performed by: INTERNAL MEDICINE

## 2022-03-30 PROCEDURE — G8417 CALC BMI ABV UP PARAM F/U: HCPCS | Performed by: INTERNAL MEDICINE

## 2022-03-30 PROCEDURE — 99213 OFFICE O/P EST LOW 20 MIN: CPT | Performed by: INTERNAL MEDICINE

## 2022-03-30 PROCEDURE — 3017F COLORECTAL CA SCREEN DOC REV: CPT | Performed by: INTERNAL MEDICINE

## 2022-03-30 PROCEDURE — G8484 FLU IMMUNIZE NO ADMIN: HCPCS | Performed by: INTERNAL MEDICINE

## 2022-03-30 PROCEDURE — G8427 DOCREV CUR MEDS BY ELIG CLIN: HCPCS | Performed by: INTERNAL MEDICINE

## 2022-03-30 RX ORDER — GLIPIZIDE 10 MG/1
TABLET ORAL
Qty: 135 TABLET | Refills: 1 | Status: SHIPPED | OUTPATIENT
Start: 2022-03-30 | End: 2022-10-06

## 2022-03-30 RX ORDER — GLIPIZIDE 10 MG/1
TABLET ORAL
Qty: 45 TABLET | Refills: 5 | Status: SHIPPED | OUTPATIENT
Start: 2022-03-30 | End: 2022-03-30 | Stop reason: SDUPTHER

## 2022-03-30 RX ORDER — DULAGLUTIDE 1.5 MG/.5ML
1.5 INJECTION, SOLUTION SUBCUTANEOUS WEEKLY
Qty: 6 ML | Refills: 1 | Status: SHIPPED | OUTPATIENT
Start: 2022-03-30 | End: 2022-09-08

## 2022-03-30 RX ORDER — KETOCONAZOLE 20 MG/G
CREAM TOPICAL
Qty: 60 G | Refills: 3 | Status: SHIPPED | OUTPATIENT
Start: 2022-03-30

## 2022-03-30 RX ORDER — DULAGLUTIDE 0.75 MG/.5ML
1.5 INJECTION, SOLUTION SUBCUTANEOUS WEEKLY
OUTPATIENT
Start: 2022-03-30

## 2022-03-30 RX ORDER — GLIPIZIDE 10 MG/1
TABLET ORAL
Qty: 135 TABLET | Refills: 1 | OUTPATIENT
Start: 2022-03-30

## 2022-03-30 NOTE — TELEPHONE ENCOUNTER
Pt will like 90 day supply on the trulicity. Also pt will like the medication sucralfate liquid changed to pill form.  Pt states that the liquid form will cost her $40.

## 2022-03-30 NOTE — PROGRESS NOTES
3/30/2022    Marco Hewitt (:  1960) is a 57 y.o. female, here for evaluation of the following medical concerns:    Chief Complaint   Patient presents with    Follow-up    Nail Problem     pt is here with c/o of toenail problem on right foot x 2 days       HPI  74-year-old white female, retired peds RN originally from [place], with history of type 2 diabetes, hypertension, hyperlipidemia, hypothyroidism, asthma who unfortunately developed cough on [place], progressing to severe left lateral chest wall pain she eventually sought care in the ER , found to have left rib 9-10 fractures with some patchy left-sided infiltrates represent resolving pneumonia. DEXA scan  T score -1.0 spine, -0.5 worst hip. Ruled out for pulmonary embolism, nodules in the left lower lobe as well as patchy opacities in the left upper and lower lobes appreciated, nodules measuring 3 to 6 mm, with mild bilateral hilar lymphadenopathy precarinal lymphadenopathy CT abdomen unremarkable. Possibly reactive from pneumonia in December, never smoker retired RN. She moved from [place] to [place] , but continued to Dr. With her PCP in [place] on home visits. At some point was prescribed Plaquenil by Dr. Griffin Barahona locally, chart diagnosis fibromyalgia. Status post hysterectomy age 39 overiectomy age 43, colonoscopy following diverticulitis age (not clarified). 2021 normal CBC BMP sed rate TSH TTG and liver function test.  Evidently checked for patient report of diarrhea. At establish care visit 2022, patient brought up multiple issues, including improving chest wall pain, recently worsening diarrhea status post Augmentin for her pneumonia a couple of months ago, worsening diabetes control (same medication up titration) though no sugars available for review today, and memory problems.     We organized diabetes labs, prescribed Singulair Flonase Breo Ellipta for her mild persistent asthma. For longstanding IBS more recently worse, we noted up titration Metformin Trulicity, potentially Cymbalta issue. We ordered C. difficile given recent Augmentin use, and asked her to start Metamucil. We organized vitamin D level and DEXA scan for rib fractures with cough. We updated hypothyroidism and hyperlipidemia labs. For longstanding dyspepsia we refilled her Zofran restarted PPI added Carafate she returns today to review test results, and undergo short test mental status. We also plan to follow-up pulmonary nodules in this non-smoker by CT May 2022. May need EGD for dyspepsia in the setting of chronic NSAID use, also query H. pylori. Review of Systems   Constitutional: Negative for activity change, appetite change, fatigue and unexpected weight change. HENT: Negative for dental problem, sinus pain, sore throat and trouble swallowing. Eyes: Negative for pain and visual disturbance. Respiratory: Negative for apnea, cough, chest tightness, shortness of breath and wheezing. Cardiovascular: Negative for chest pain and palpitations. Gastrointestinal: Negative for abdominal pain, blood in stool, constipation,  nausea, rectal pain and vomiting. Endocrine: Negative for cold intolerance, heat intolerance, polydipsia, polyphagia and polyuria. Genitourinary: Negative for difficulty urinating, dysuria, flank pain, frequency, hematuria, pelvic pain, urgency, vaginal bleeding and vaginal discharge. Musculoskeletal: Negative for arthralgias, back pain, gait problem, joint swelling, myalgias, neck pain and neck stiffness. Skin: Negative for color change and rash. Neurological: Negative for dizziness, tremors, syncope, speech difficulty, weakness, light-headedness and headaches. Hematological: Negative for adenopathy. Does not bruise/bleed easily.    Psychiatric/Behavioral: Negative for agitation, behavioral problems, decreased concentration, sleep disturbance and suicidal ideas. The patient is not nervous/anxious and is not hyperactive. Prior to Visit Medications    Medication Sig Taking? Authorizing Provider   ketoconazole (NIZORAL) 2 % cream Apply topically daily. Yes Shyann Luna MD   sucralfate (CARAFATE) 1 GM/10ML suspension Take 10 mLs by mouth 3 times daily (before meals) Yes Shyann Luna MD   vitamin D (ERGOCALCIFEROL) 1.25 MG (84984 UT) CAPS capsule once a week Yes Historical Provider, MD   BREKRISTIAN ELLIPTA 200-25 MCG/INH AEPB inhaler INHALE 1 PUFF BY MOUTH ONCE A DAY Yes Historical Provider, MD   fluticasone (FLONASE) 50 MCG/ACT nasal spray daily Yes Historical Provider, MD   ONETOUCH ULTRA strip USE TO TEST BLOOD SUGAR TWICE DAILY Yes Historical Provider, MD Carranza Delica Lancets 13H MISC USE TO TEST BLOOD SUGAR TWICE DAILY Yes Historical Provider, MD   metFORMIN (GLUCOPHAGE) 1000 MG tablet 2 times daily (with meals) Yes Historical Provider, MD   pantoprazole (PROTONIX) 40 MG tablet Take 1 tablet by mouth every morning (before breakfast) Yes Shyann Luna MD   Dulaglutide (TRULICITY) 7.29 OA/4.0KH SOPN Inject 1.5 mg into the skin once a week  Yes Historical Provider, MD   gabapentin (NEURONTIN) 300 MG capsule Take 300 mg by mouth 2 times daily.   Yes Historical Provider, MD   glipiZIDE (GLUCOTROL) 10 MG tablet Take 10 mg by mouth 2 times daily (before meals) Yes Historical Provider, MD   DULoxetine (CYMBALTA) 60 MG extended release capsule Take 60 mg by mouth daily Yes Historical Provider, MD   hydroxychloroquine (PLAQUENIL) 200 MG tablet Take by mouth daily Yes Historical Provider, MD   pravastatin (PRAVACHOL) 10 MG tablet Take 10 mg by mouth daily Yes Historical Provider, MD   lisinopril (PRINIVIL;ZESTRIL) 20 MG tablet Take 20 mg by mouth daily ? dose Yes Historical Provider, MD   levothyroxine (SYNTHROID) 75 MCG tablet Take 75 mcg by mouth Daily Yes Historical Provider, MD   albuterol sulfate  (90 Base) MCG/ACT inhaler Inhale 2 puffs into the lungs every 6 hours as needed for Wheezing Yes Historical Provider, MD   budesonide-formoterol (SYMBICORT) 160-4.5 MCG/ACT AERO Inhale 2 puffs into the lungs 2 times daily  Yes Historical Provider, MD   montelukast (SINGULAIR) 10 MG tablet Take 10 mg by mouth nightly Yes Historical Provider, MD   lansoprazole (PREVACID) 30 MG delayed release capsule Take 30 mg by mouth as needed Yes Historical Provider, MD        Allergies   Allergen Reactions    Latex      Gloves cause skin irritation and asthma       Past Medical History:   Diagnosis Date    Arthritis     Asthma     Diabetes mellitus ([address]     Diverticulitis     Fibromyalgia     GERD (gastroesophageal reflux disease)     IBS (irritable bowel syndrome)        Past Surgical History:   Procedure Laterality Date    COLONOSCOPY      HYSTERECTOMY      age 39    LAPAROSCOPY      OVARY REMOVAL      age 43       Social History     Socioeconomic History    Marital status:      Spouse name: Not on file    Number of children: Not on file    Years of education: Not on file    Highest education level: Not on file   Occupational History    Not on file   Tobacco Use    Smoking status: Never Smoker    Smokeless tobacco: Never Used   Substance and Sexual Activity    Alcohol use: Yes     Comment: rare    Drug use: No    Sexual activity: Not on file   Other Topics Concern    Not on file   Social History Narrative    Not on file     Social Determinants of Health     Financial Resource Strain: Low Risk     Difficulty of Paying Living Expenses: Not hard at all   Food Insecurity: No Food Insecurity    Worried About Running Out of Food in the Last Year: Never true    [address] in the Last Year: Never true   Transportation Needs:     Lack of Transportation (Medical): Not on file    Lack of Transportation (Non-Medical):  Not on file   Physical Activity:     Days of Exercise per Week: Not on file    Minutes of Exercise per Session: Not on file Stress:     Feeling of Stress : Not on file   Social Connections:     Frequency of Communication with Friends and Family: Not on file    Frequency of Social Gatherings with Friends and Family: Not on file    Attends Faith Services: Not on file    Active Member of [address] [place] [address] [email or web address] or Organizations: Not on file    Attends Club or Organization Meetings: Not on file    Marital Status: Not on file   Intimate Partner Violence:     Fear of Current or Ex-Partner: Not on file    Emotionally Abused: Not on file    Physically Abused: Not on file    Sexually Abused: Not on file   Housing Stability:     Unable to Pay for Housing in the Last Year: Not on file    Number of [place] in the Last Year: Not on file    Unstable Housing in the Last Year: Not on file        Family History   Problem Relation Age of Onset    Breast Cancer Paternal Aunt     Breast Cancer Other     Ovarian Cancer Other     Ovarian Cancer Other     Ovarian Cancer Other     Ovarian Cancer Other        Vitals:    03/30/22 1004 03/30/22 1010   BP: (!) 144/82 (!) 143/82   Pulse: 83 87   Temp: 97.2 °F (36.2 °C)    TempSrc: Temporal    SpO2: 98%    Weight: 180 lb (81.6 kg)    Height: 5' 2\" (1.575 m)      Estimated body mass index is 32.92 kg/m² as calculated from the following:    Height as of this encounter: 5' 2\" (1.575 m). Weight as of this encounter: 180 lb (81.6 kg). PHYSICAL EXAM  GENERAL:  Pleasant  female who looks her stated age, awake alert and oriented x3, no acute distress. LUNGS:  Clear to auscultation bilaterally. Excellent air entry. No inspiratory crackles or expiratory wheezes. No Pleural friction rub. HEART:  Regular rate and rhythm without pathologic murmur rub gallop S3 or S4. ABDOMEN:  Soft, nontender. Normal bowel sounds. No guarding. No masses. Extremities: Bilateral palmar Dupuytren contractures. No stocking paresthesia or diabetic foot ulcers. Adequate circulation 2+ pulses.   Scant distal lower extremity edema. PSYCH:  No psychomotor retardation or agitation. Good eye contact. Unrestricted affect range. Mood congruent with affect. Garrulous, occasionally tangential thought.       [id number] [address] [facility] [place] [facility] Visit on 02/28/2022   Component Date Value    C.diff Toxin/Antigen 02/28/2022                      Value:Negative for Clostridium difficile antigen and toxin  Normal Range: Negative     Hospital Outpatient Visit on 02/26/2022   Component Date Value    Microalbumin, Random Uri* 02/26/2022 <1.20     Creatinine, Ur 02/26/2022 69.3     Microalbumin Creatinine * 02/26/2022 see below    Orders Only on 02/25/2022   Component Date Value    TSH 02/25/2022 1.55     Sodium 02/25/2022 140     Potassium 02/25/2022 4.1     Chloride 02/25/2022 102     CO2 02/25/2022 23     Anion Gap 02/25/2022 15     Glucose 02/25/2022 117*    BUN 02/25/2022 18     CREATININE 02/25/2022 0.5*    GFR Non- 02/25/2022 >60     GFR  02/25/2022 >60     Calcium 02/25/2022 9.3     Hemoglobin A1C 02/25/2022 6.7     eAG 02/25/2022 145.6     Cholesterol, Total 02/25/2022 156     Triglycerides 02/25/2022 71     HDL 02/25/2022 58     LDL Calculated 02/25/2022 84     VLDL Cholesterol Calcula* 02/25/2022 14     AST 02/25/2022 16     ALT 02/25/2022 16     Vit D, 25-Hydroxy 02/25/2022 47.5    Admission on 02/08/2022, Discharged on 02/08/2022   Component Date Value    Ventricular Rate 02/08/2022 77     Atrial Rate 02/08/2022 77     P-R Interval 02/08/2022 130     QRS Duration 02/08/2022 86     Q-T Interval 02/08/2022 400     QTc Calculation (Bazett) 02/08/2022 452     P Axis 02/08/2022 37     R Axis 02/08/2022 14     T Axis 02/08/2022 23     Diagnosis 02/08/2022 Normal sinus rhythmConfirmed by Shanna Reynolds ([id number]) on 2/10/2022 5:46:15 PM     WBC 02/08/2022 7.7     RBC 02/08/2022 4.34     Hemoglobin 02/08/2022 11.9*    Hematocrit 02/08/2022 36.8     MCV 02/08/2022 84.8     MCH 02/08/2022 27.5     MCHC 02/08/2022 32.4     RDW 02/08/2022 13.7     Platelets 94/54/3428 367     MPV 02/08/2022 6.7     Neutrophils % 02/08/2022 70.0     Lymphocytes % 02/08/2022 18.5     Monocytes % 02/08/2022 8.7     Eosinophils % 02/08/2022 1.7     Basophils % 02/08/2022 1.1     Neutrophils Absolute 02/08/2022 5.4     Lymphocytes Absolute 02/08/2022 1.4     Monocytes Absolute 02/08/2022 0.7     Eosinophils Absolute 02/08/2022 0.1     Basophils Absolute 02/08/2022 0.1     Rejected Test 02/08/2022 bmpx,trop     Reason for Rejection 02/08/2022 see below     Sodium 02/08/2022 139     Potassium reflex Magnesi* 02/08/2022 4.1     Chloride 02/08/2022 100     CO2 02/08/2022 26     Anion Gap 02/08/2022 13     Glucose 02/08/2022 114*    BUN 02/08/2022 21*    CREATININE 02/08/2022 <0.5*    GFR Non- 02/08/2022 >60     GFR  02/08/2022 >60     Calcium 02/08/2022 9.1     Troponin 02/08/2022 <0.01     Color, UA 02/08/2022 YELLOW     Clarity, UA 02/08/2022 Clear     Glucose, Ur 02/08/2022 Negative     Bilirubin Urine 02/08/2022 Negative     Ketones, Urine 02/08/2022 Negative     Specific Gravity, UA 02/08/2022 1.027     Blood, Urine 02/08/2022 Negative     pH, UA 02/08/2022 5.5     Protein, UA 02/08/2022 Negative     Urobilinogen, Urine 02/08/2022 0.2     Nitrite, Urine 02/08/2022 Negative     Leukocyte Esterase, Urine 02/08/2022 Negative     Microscopic Examination 02/08/2022 Not Indicated     Urine Type 02/08/2022 Voided     Urine Reflex to Culture 02/08/2022 Not Indicated          ASSESSMENT/PLAN  I am a bit concerned that her [place] PCP continues to be involved in her care, because coordination of care, avoidance of duplication of effort is potentially problematic. We will assess degree of involvement at follow-up, moving forward.     1. Type 2 diabetes mellitus without complication, without long-term current use of insulin ([facility] [address]  Recent up titration for suboptimal diabetes control rising A1c, with Metformin currently at 1000 twice daily, Trullicity at 1.5 mg/week, A1c down to 6.7% twice daily blood sugars 80-1 60 in the morning, half under 140. Hypoglycemia events: 10 AM 82, 5 AM 89, 4 AM 67, 4 PM 60. Overdue for eye exam. IBS may not be tolerating her DM meds, see below, but so far so good. On ACEi, statin but not baby aspirin, perhaps due to NSAID use. No known retinopathy nephropathy or neuropathy. Evidently may need to decrease Glucotrol due to early morning hypoglycemia on occasion, from 10/10-10/5.    2. Mild persistent asthma, unspecified whether complicated  Prescribed Singulair, flonase nasal spray, Breo Ellipta (LABA/steroid). Wonder if reflux component, less albuterol since PPI restarted. Seasonal variation. 3. Diarrhea, unspecified type  4. Irritable bowel syndrome with diarrhea    Patient provides longstanding history of diarrhea predominant irritable bowel, normally 3-5 times per day not awakening her from sleep and denies history of lactose intolerance. Either her PCP or rheumatologist checked sed rate, TTG both normal December 6, 2021 before her pneumonia/antibiotics. Several months ago dose increase Metformin and Trulicity, may be contributing. Even Cymbalta potentially culprit. Patient states prior colonoscopy in [place] after diverticulitis 2017, then 2021 EGD/Colonoscopy for IBS dysphagia, she will get results forwarded to me. C dif neg. 5. Hypertension, essential  Reassuring BMP, fair control lisinopril 20    6. Closed fracture of multiple ribs of left side with routine healing, subsequent encounter  Presumed etiology coughing during pneumonia. Osteopenia with adequate vitamin D levels. Would not escalate treatment. 7.  Deer tick bite  Tiny tick removed from low back 2 weeks ago, fever, joint pains transient without erythema margin not on, will screen for Lyme disease.     8. Hypothyroidism, unspecified type  TSH consistent with adequate repletion on 75 mcg Synthroid. Recheck 1 year. 9. Mixed hyperlipidemia  Though not in patient chart, patient on low-dose pravastatin, suspect statin myalgia for concern for such given pre-existing fibromyalgia. 10. Routine adult health maintenance  Only [facility] primary series, check status Tdap COVID booster influenza Shingrix at follow-up. EGD colonoscopy 2021, results unknown. Mammogram September 2022. Status post hysterectomy therefore no Pap. Hep C screen -2022. 13. Memory problem  [place] 37 of 38 points. Suspect either mild attention issues such as ADD or dyslexia, anxiety, but no obvious cognitive deficit. 14. Fibromyalgia  Followed in rheumatology. Only briefly on Plaquenil. Do not have results of any rheumatology work-up other than normal sed rate December 2021. Currently on Cymbalta 60 mg at bedtime by gabapentin 300 twice daily. 15. Pulmonary nodules  February 8, 2022 chest CT. Mild bilateral hilar lymphadenopathy, precarinal lymph node, patchy infiltrate left upper and lower lobes presumed remnant of prior pneumonia. NonSmoker, 3 to 6 mm nodules left  upper lobe, for which obtain follow-up CT in 3 months, May 2022, repeat in 18 months if indicated. 16. Dupuytren contracture  Seeing a hand surgeon. 17.  Dyspepsia. Chronic NSAID use. Discomfort despite prior PPI, no EtOH. No melena or solid food dysphagia. Not taking Mobic and ibuprofen concurrently. Wonder about H. pylori, Metformin and or Trulicity side effect. Evidently EGD, results unknown; however patient feels that PPI plus Carafate is helping indeed she has not needed Zofran since starting these medications. 18.  Bullous tinea pedis. Brisk capillary refill. Nizoral prescription provided. Asked to use gauze between toes. Return in about 3 months (around 6/30/2022). It was a pleasure to visit with Ms. Tejas Ely today.   Answered all questions as best I could.   Juliette Dubon MD   Time 28 minutes

## 2022-03-30 NOTE — TELEPHONE ENCOUNTER
----- Message from Mejia Greenfield MD sent at 3/30/2022  2:42 PM EDT -----  Inform patient to reduce risk of overnight hypoglycemia I would have her change Glucotrol from 10 mg twice daily to 10 mg in the morning 5 mg with evening meal.  Scription sent.

## 2022-03-30 NOTE — TELEPHONE ENCOUNTER
----- Message from Cynthia Green MD sent at 3/30/2022  2:42 PM EDT -----  Inform patient to reduce risk of overnight hypoglycemia I would have her change Glucotrol from 10 mg twice daily to 10 mg in the morning 5 mg with evening meal.  Scription sent.

## 2022-03-30 NOTE — PATIENT INSTRUCTIONS
1. Fax egd/colonscopy results AND path rpt to my office [phone] fax number , office number [phone]  2. Make sure the pharmacy has my name on all your scripts for refills  3. Nizoral cream to foot fungus 2x/day when bad, daily when better. 4. Blood test for lyme disease today  5.  Find eye dr in your network

## 2022-04-01 LAB — LYME, EIA: 0.15 LIV (ref 0–1.2)

## 2022-06-01 ENCOUNTER — TELEPHONE (OUTPATIENT)
Dept: PRIMARY CARE CLINIC | Age: 62
End: 2022-06-01

## 2022-06-01 RX ORDER — ERGOCALCIFEROL 1.25 MG/1
50000 CAPSULE ORAL WEEKLY
Qty: 12 CAPSULE | Refills: 1 | Status: SHIPPED | OUTPATIENT
Start: 2022-06-01 | End: 2022-09-12

## 2022-06-01 RX ORDER — CETIRIZINE HYDROCHLORIDE 10 MG/1
10 TABLET ORAL DAILY
Qty: 90 TABLET | Refills: 1 | Status: SHIPPED | OUTPATIENT
Start: 2022-06-01 | End: 2022-07-01

## 2022-06-01 RX ORDER — SUCRALFATE 1 G/1
1 TABLET ORAL
Qty: 270 TABLET | Refills: 1 | Status: SHIPPED | OUTPATIENT
Start: 2022-06-01 | End: 2022-09-12 | Stop reason: SDUPTHER

## 2022-06-01 NOTE — TELEPHONE ENCOUNTER
Pt needs refills on  vitamin D (ERGOCALCIFEROL) 1.25 MG (46388 UT) CAPS capsule     metFORMIN (GLUCOPHAGE) 1000 MG sucralfate (CARAFATE) 1 GM/10ML suspension   Cetrizine 10 mg      Pt also wants to know if she can get the sucralfate (carafate) in pill form because the liquid is to expensive.   Please advise

## 2022-09-07 NOTE — TELEPHONE ENCOUNTER
Medication:   Requested Prescriptions     Pending Prescriptions Disp Refills    TRULICITY 1.5 UR/3.1SV SOPN [Pharmacy Med Name: Wen Hernández 2.2AU/1.6YV SDP 0.5ML] 6 mL 1     Sig: ADMINISTER 1.5 MG UNDER THE SKIN 1 TIME A WEEK       Last Filled:      Patient Phone Number: [phone] (home)     Last appt: 3/30/2022   Next appt: Visit date not found    Last Labs DM:   Lab Results   Component Value Date/Time    LABA1C 6.7 02/25/2022 10:13 AM

## 2022-09-08 RX ORDER — DULAGLUTIDE 1.5 MG/.5ML
INJECTION, SOLUTION SUBCUTANEOUS
Qty: 6 ML | Refills: 1 | Status: SHIPPED | OUTPATIENT
Start: 2022-09-08 | End: 2022-09-12 | Stop reason: SDUPTHER

## 2022-09-12 ENCOUNTER — OFFICE VISIT (OUTPATIENT)
Dept: PRIMARY CARE CLINIC | Age: 62
End: 2022-09-12
Payer: COMMERCIAL

## 2022-09-12 VITALS
HEART RATE: 78 BPM | DIASTOLIC BLOOD PRESSURE: 86 MMHG | WEIGHT: 173 LBS | SYSTOLIC BLOOD PRESSURE: 146 MMHG | BODY MASS INDEX: 31.64 KG/M2 | OXYGEN SATURATION: 98 % | TEMPERATURE: 97.2 F

## 2022-09-12 DIAGNOSIS — E11.9 TYPE 2 DIABETES MELLITUS WITHOUT COMPLICATION, WITHOUT LONG-TERM CURRENT USE OF INSULIN (HCC): Primary | ICD-10-CM

## 2022-09-12 DIAGNOSIS — I10 ESSENTIAL HYPERTENSION: ICD-10-CM

## 2022-09-12 LAB — HBA1C MFR BLD: 6.8 %

## 2022-09-12 PROCEDURE — 83036 HEMOGLOBIN GLYCOSYLATED A1C: CPT | Performed by: INTERNAL MEDICINE

## 2022-09-12 PROCEDURE — 2022F DILAT RTA XM EVC RTNOPTHY: CPT | Performed by: INTERNAL MEDICINE

## 2022-09-12 PROCEDURE — G8417 CALC BMI ABV UP PARAM F/U: HCPCS | Performed by: INTERNAL MEDICINE

## 2022-09-12 PROCEDURE — 1036F TOBACCO NON-USER: CPT | Performed by: INTERNAL MEDICINE

## 2022-09-12 PROCEDURE — 99214 OFFICE O/P EST MOD 30 MIN: CPT | Performed by: INTERNAL MEDICINE

## 2022-09-12 PROCEDURE — 3044F HG A1C LEVEL LT 7.0%: CPT | Performed by: INTERNAL MEDICINE

## 2022-09-12 PROCEDURE — 3017F COLORECTAL CA SCREEN DOC REV: CPT | Performed by: INTERNAL MEDICINE

## 2022-09-12 PROCEDURE — G8427 DOCREV CUR MEDS BY ELIG CLIN: HCPCS | Performed by: INTERNAL MEDICINE

## 2022-09-12 RX ORDER — LEVOTHYROXINE SODIUM 0.07 MG/1
75 TABLET ORAL DAILY
Qty: 90 TABLET | Refills: 2 | Status: SHIPPED | OUTPATIENT
Start: 2022-09-12

## 2022-09-12 RX ORDER — DULOXETIN HYDROCHLORIDE 60 MG/1
60 CAPSULE, DELAYED RELEASE ORAL DAILY
Qty: 90 CAPSULE | Refills: 3 | Status: SHIPPED | OUTPATIENT
Start: 2022-09-12

## 2022-09-12 RX ORDER — SUCRALFATE 1 G/1
1 TABLET ORAL
Qty: 270 TABLET | Refills: 1 | Status: SHIPPED | OUTPATIENT
Start: 2022-09-12

## 2022-09-12 RX ORDER — DULAGLUTIDE 1.5 MG/.5ML
INJECTION, SOLUTION SUBCUTANEOUS
Qty: 6 ML | Refills: 1 | Status: SHIPPED | OUTPATIENT
Start: 2022-09-12

## 2022-09-12 RX ORDER — MONTELUKAST SODIUM 10 MG/1
10 TABLET ORAL NIGHTLY
Qty: 90 TABLET | Refills: 3 | Status: SHIPPED | OUTPATIENT
Start: 2022-09-12

## 2022-09-12 RX ORDER — ALBUTEROL SULFATE 90 UG/1
2 AEROSOL, METERED RESPIRATORY (INHALATION) EVERY 6 HOURS PRN
Qty: 18 G | Refills: 3 | Status: SHIPPED | OUTPATIENT
Start: 2022-09-12

## 2022-09-12 RX ORDER — LISINOPRIL 40 MG/1
40 TABLET ORAL DAILY
Qty: 90 TABLET | Refills: 1 | Status: SHIPPED | OUTPATIENT
Start: 2022-09-12 | End: 2022-09-14 | Stop reason: SDUPTHER

## 2022-09-12 RX ORDER — GABAPENTIN 300 MG/1
300 CAPSULE ORAL 2 TIMES DAILY
Qty: 180 CAPSULE | Refills: 3 | Status: SHIPPED | OUTPATIENT
Start: 2022-09-12 | End: 2022-12-11

## 2022-09-12 RX ORDER — PANTOPRAZOLE SODIUM 40 MG/1
40 TABLET, DELAYED RELEASE ORAL
Qty: 90 TABLET | Refills: 1 | Status: SHIPPED | OUTPATIENT
Start: 2022-09-12

## 2022-09-12 RX ORDER — ERGOCALCIFEROL 1.25 MG/1
50000 CAPSULE ORAL WEEKLY
Qty: 12 CAPSULE | Refills: 1 | Status: CANCELLED | OUTPATIENT
Start: 2022-09-12

## 2022-09-12 RX ORDER — FLUTICASONE PROPIONATE 50 MCG
1 SPRAY, SUSPENSION (ML) NASAL DAILY
Qty: 48 G | Refills: 3 | Status: SHIPPED | OUTPATIENT
Start: 2022-09-12

## 2022-09-12 RX ORDER — PRAVASTATIN SODIUM 10 MG
10 TABLET ORAL DAILY
Qty: 90 TABLET | Refills: 3 | Status: SHIPPED | OUTPATIENT
Start: 2022-09-12

## 2022-09-12 RX ORDER — CHOLECALCIFEROL (VITAMIN D3) 50 MCG
2000 TABLET ORAL DAILY
Qty: 90 TABLET | Refills: 3 | Status: SHIPPED | OUTPATIENT
Start: 2022-09-12

## 2022-09-12 ASSESSMENT — PATIENT HEALTH QUESTIONNAIRE - PHQ9
SUM OF ALL RESPONSES TO PHQ QUESTIONS 1-9: 0
SUM OF ALL RESPONSES TO PHQ QUESTIONS 1-9: 0
2. FEELING DOWN, DEPRESSED OR HOPELESS: 0
SUM OF ALL RESPONSES TO PHQ QUESTIONS 1-9: 0
1. LITTLE INTEREST OR PLEASURE IN DOING THINGS: 0
SUM OF ALL RESPONSES TO PHQ QUESTIONS 1-9: 0
SUM OF ALL RESPONSES TO PHQ9 QUESTIONS 1 & 2: 0

## 2022-09-12 NOTE — PROGRESS NOTES
2022    Yifan Wilson (:  1960) is a 60 y.o. female, here for evaluation of the following medical concerns:    No chief complaint on file. HPI  25-year-old white female, retired peds RN originally from [place], with history of type 2 diabetes, hypertension, hyperlipidemia, hypothyroidism, asthma who has noticed worsening blood pressure control, despite compliance with lisinopril. Blood pressures running 150s to 160s over 80s to 90s pulse in the 90s. She also notes early morning blood sugars running 110 to 150, infrequently as low as 80; while p.m. blood sugars running in the 140s, but infrequently dropping as low as 45. She usually has a brunch and dinner. Currently prescribed Trulicity 1.5 glipizide 10/5 immediate release, metformin 1000 mg twice daily. Review of Systems   Constitutional: Negative for activity change, appetite change, fatigue and unexpected weight change. HENT: Negative for dental problem, sinus pain, sore throat and trouble swallowing. Eyes: Negative for pain and visual disturbance. Respiratory: Negative for apnea, cough, chest tightness, shortness of breath and wheezing. Cardiovascular: Negative for chest pain and palpitations. Gastrointestinal: Negative for abdominal pain, blood in stool, constipation,  nausea, rectal pain and vomiting. Endocrine: Negative for cold intolerance, heat intolerance, polydipsia, polyphagia and polyuria. Genitourinary: Negative for difficulty urinating, dysuria, flank pain, frequency, hematuria, pelvic pain, urgency, vaginal bleeding and vaginal discharge. Musculoskeletal: Negative for arthralgias, back pain, gait problem, joint swelling, myalgias, neck pain and neck stiffness. Skin: Negative for color change and rash. Neurological: Negative for dizziness, tremors, syncope, speech difficulty, weakness, light-headedness and headaches. Hematological: Negative for adenopathy. Does not bruise/bleed easily. Psychiatric/Behavioral: Negative for agitation, behavioral problems, decreased concentration, sleep disturbance and suicidal ideas. The patient is not nervous/anxious and is not hyperactive. Prior to Visit Medications    Medication Sig Taking? Authorizing Provider   TRULICITY 1.5 TN/1.2ZX SOPN ADMINISTER 1.5 MG UNDER THE SKIN 1 TIME A WEEK  Rancho Gore MD   metFORMIN (GLUCOPHAGE) 1000 MG tablet Take 1 tablet by mouth 2 times daily (with meals)  Terence Morgan MD   vitamin D (ERGOCALCIFEROL) 1.25 MG (88067 UT) CAPS capsule Take 1 capsule by mouth once a week  Terence Morgan MD   sucralfate (CARAFATE) 1 GM tablet Take 1 tablet by mouth 3 times daily (before meals)  Terence Morgan MD   ketoconazole (NIZORAL) 2 % cream Apply topically daily. Terence Morgan MD   glipiZIDE (GLUCOTROL) 10 MG tablet Take 10 mg in the morning 5 mg with evening meal.  Terence Morgan MD   BREO ELLIPTA 200-25 MCG/INH AEPB inhaler INHALE 1 PUFF BY MOUTH ONCE A DAY  Historical Provider, MD   fluticasone (FLONASE) 50 MCG/ACT nasal spray daily  Historical Provider, MD   ONETOUCH ULTRA strip USE TO TEST BLOOD SUGAR TWICE DAILY  Historical Provider, MD Carranza Delica Lancets 87V MISC USE TO TEST BLOOD SUGAR TWICE DAILY  Historical Provider, MD   pantoprazole (PROTONIX) 40 MG tablet Take 1 tablet by mouth every morning (before breakfast)  Terence Morgan MD   gabapentin (NEURONTIN) 300 MG capsule Take 300 mg by mouth 2 times daily.    Historical Provider, MD   DULoxetine (CYMBALTA) 60 MG extended release capsule Take 60 mg by mouth daily  Historical Provider, MD   hydroxychloroquine (PLAQUENIL) 200 MG tablet Take by mouth daily  Historical Provider, MD   pravastatin (PRAVACHOL) 10 MG tablet Take 10 mg by mouth daily  Historical Provider, MD   lisinopril (PRINIVIL;ZESTRIL) 20 MG tablet Take 20 mg by mouth daily ? dose  Historical Provider, MD   levothyroxine (SYNTHROID) 75 MCG tablet Take 75 mcg by mouth Daily  Historical Provider, History   Problem Relation Age of Onset    Breast Cancer Paternal Aunt     Breast Cancer Other     Ovarian Cancer Other     Ovarian Cancer Other     Ovarian Cancer Other     Ovarian Cancer Other        There were no vitals filed for this visit. Estimated body mass index is 32.92 kg/m² as calculated from the following:    Height as of 3/30/22: 5' 2\" (1.575 m). Weight as of 3/30/22: 180 lb (81.6 kg). PHYSICAL EXAM  GENERAL:  Pleasant  female who looks her stated age, awake alert and oriented x3, no acute distress. PSYCH:  No psychomotor retardation or agitation. Good eye contact. Unrestricted affect range. Mood congruent with affect. Garrulous, occasionally tangential thought. LABS  Lab Review   Orders Only on 03/30/2022   Component Date Value    Lyme, EIA 03/30/2022 0.15    Hospital Outpatient Visit on 02/28/2022   Component Date Value    C.diff Toxin/Antigen 02/28/2022                      Value:Negative for Clostridium difficile antigen and toxin  Normal Range: Negative     Hospital Outpatient Visit on 02/26/2022   Component Date Value    Microalbumin, Random Uri* 02/26/2022 <1.20     Creatinine, Ur 02/26/2022 69.3     Microalbumin Creatinine * 02/26/2022 see below    Orders Only on 02/25/2022   Component Date Value    TSH 02/25/2022 1.55     Sodium 02/25/2022 140     Potassium 02/25/2022 4.1     Chloride 02/25/2022 102     CO2 02/25/2022 23     Anion Gap 02/25/2022 15     Glucose 02/25/2022 117 (A)    BUN 02/25/2022 18     Creatinine 02/25/2022 0.5 (A)    GFR Non- 02/25/2022 >60     GFR  02/25/2022 >60     Calcium 02/25/2022 9.3     Hemoglobin A1C 02/25/2022 6.7     eAG 02/25/2022 145. 6     Cholesterol, Total 02/25/2022 156     Triglycerides 02/25/2022 71     HDL 02/25/2022 58     LDL Calculated 02/25/2022 84     VLDL Cholesterol Calcula* 02/25/2022 14     AST 02/25/2022 16     ALT 02/25/2022 16     Vit D, 25-Hydroxy 02/25/2022 47.5    Admission on 02/08/2022, Discharged on 02/08/2022   Component Date Value    Ventricular Rate 02/08/2022 77     Atrial Rate 02/08/2022 77     P-R Interval 02/08/2022 130     QRS Duration 02/08/2022 86     Q-T Interval 02/08/2022 400     QTc Calculation (Bazett) 02/08/2022 452     P Axis 02/08/2022 37     R [place] 02/08/2022 14     T Axis 02/08/2022 23     Diagnosis 02/08/2022 Normal sinus rhythmConfirmed by Rehana Muñoz ([id number]) on 2/10/2022 5:46:15 PM     WBC 02/08/2022 7.7     RBC 02/08/2022 4.34     Hemoglobin 02/08/2022 11.9 (A)    Hematocrit 02/08/2022 36.8     MCV 02/08/2022 84.8     MCH 02/08/2022 27.5     MCHC 02/08/2022 32.4     RDW 02/08/2022 13.7     Platelets 07/22/4662 367     MPV 02/08/2022 6.7     Neutrophils % 02/08/2022 70.0     Lymphocytes % 02/08/2022 18.5     Monocytes % 02/08/2022 8.7     Eosinophils % 02/08/2022 1.7     Basophils % 02/08/2022 1.1     Neutrophils Absolute 02/08/2022 5.4     Lymphocytes Absolute 02/08/2022 1.4     Monocytes Absolute 02/08/2022 0.7     Eosinophils Absolute 02/08/2022 0.1     Basophils Absolute 02/08/2022 0.1     Rejected Test 02/08/2022 bmpx,trop     Reason for Rejection 02/08/2022 see below     Sodium 02/08/2022 139     Potassium reflex Magnesi* 02/08/2022 4.1     Chloride 02/08/2022 100     CO2 02/08/2022 26     Anion Gap 02/08/2022 13     Glucose 02/08/2022 114 (A)    BUN 02/08/2022 21 (A)    Creatinine 02/08/2022 <0.5 (A)    GFR Non- 02/08/2022 >60     GFR  02/08/2022 >60     Calcium 02/08/2022 9.1     Troponin 02/08/2022 <0.01     Color, UA 02/08/2022 YELLOW     Clarity, UA 02/08/2022 Clear     Glucose, Ur 02/08/2022 Negative     Bilirubin Urine 02/08/2022 Negative     Ketones, Urine 02/08/2022 Negative     Specific Gravity, UA 02/08/2022 1.027     Blood, Urine 02/08/2022 Negative     pH, UA 02/08/2022 5.5     Protein, UA 02/08/2022 Negative     Urobilinogen, Urine 02/08/2022 0.2     Nitrite, Urine 02/08/2022 Negative     Leukocyte Esterase, Urine 02/08/2022 Negative     Microscopic Examination 02/08/2022 Not Indicated     Urine Type 02/08/2022 Voided     Urine Reflex to Culture 02/08/2022 Not Indicated          ASSESSMENT/PLAN  I am a bit concerned that her [place] PCP continues to be involved in her care, because coordination of care, avoidance of duplication of effort is potentially problematic. We will assess degree of involvement at follow-up, moving forward. 1. Type 2 diabetes mellitus without complication, without long-term current use of insulin ([facility])  Last year up titration for suboptimal diabetes control rising A1c, with Metformin currently at 1000 twice daily, Trullicity at 1.5 mg/week, glipizide 10/5 A1c down to 6.8% t unchanged from 6.7% February 2022. Her records are in disarray today so it is difficult to estimate the frequency, but it sounds like she is overtreated. She is also taking her immediate release glipizide first thing in the morning though she may not eat her brunch for several hours. Agreed on the following plan:  -Take morning dose of glipizide just before her brunch  -Organize blood sugars in a easily assembled format, including 5 bedtime readings    She would appear to be overtreated. Anticipate decreasing glipizide from 10/5-5/5, continuing metformin at 1000 twice daily. 2. Mild persistent asthma, unspecified whether complicated  Prescribed Singulair, flonase nasal spray, Breo Ellipta (LABA/steroid). Wonder if reflux component, less albuterol since PPI restarted. Seasonal variation. 3. Diarrhea, unspecified type  4. Irritable bowel syndrome with diarrhea    Patient provides longstanding history of diarrhea predominant irritable bowel, normally 3-5 times per day not awakening her from sleep and denies history of lactose intolerance. Either her PCP or rheumatologist checked sed rate, TTG both normal December 6, 2021 before her pneumonia/antibiotics.   Several months ago dose increase Metformin and Trulicity, may be contributing. Even Cymbalta potentially culprit. Patient states prior colonoscopy in [place] after diverticulitis 2017, then 2021 EGD/Colonoscopy for IBS dysphagia, she will get results forwarded to me. C dif neg. 5. Hypertension, essential  Reassuring BMP, inadequate control lisinopril 20. Increase to 40mg for bp running 150-160/80-90. Patient to update me in a couple of weeks regarding where her pressures are running. 6. Hypothyroidism, unspecified type  TSH consistent with adequate repletion on 75 mcg Synthroid. Recheck 1 year. 7. Mixed hyperlipidemia  Though not in patient chart, patient on low-dose pravastatin, note prior history fibromyalgia. 8. Routine adult health maintenance  Only [facility] primary series, check status Tdap COVID booster influenza Shingrix at follow-up. EGD colonoscopy 2021, results unknown. Mammogram September 2022. Status post hysterectomy therefore no Pap. Hep C screen -2022.  9.  Stated history of memory problem  Ricardo 37 of 38 points, 2022. Suspect either mild attention issues such as ADD or dyslexia, anxiety, but no obvious cognitive deficit. 14. Fibromyalgia  Followed in rheumatology. Only briefly on Plaquenil. Do not have results of any rheumatology work-up other than normal sed rate December 2021. Currently on Cymbalta 60 mg at bedtime by gabapentin 300 twice daily. 15. Pulmonary nodules  February 8, 2022 chest CT. Mild bilateral hilar lymphadenopathy, precarinal lymph node, patchy infiltrate left upper and lower lobes presumed remnant of prior pneumonia. NonSmoker, 3 to 6 mm nodules left  upper lobe, for which obtain follow-up CT in 3 months, May 2022, repeat in 18 months if indicated. 16. Dupuytren contracture  Seeing a hand surgeon. 17.  Dyspepsia. Chronic NSAID use. Discomfort despite prior PPI, no EtOH. No melena or solid food dysphagia. Not taking Mobic and ibuprofen concurrently.   Wonder about H.

## 2022-09-12 NOTE — PATIENT INSTRUCTIONS
Start Lisinopril 40mg daily . Give me 2 weeks of blood pressure readings  A1c today  Organize AM and PM sugars into 2 columns. I also want 5 bedtime sugars. Take glipizide just before 1st meal of the day, not first thing in the morning.

## 2022-09-13 ENCOUNTER — TELEPHONE (OUTPATIENT)
Dept: PRIMARY CARE CLINIC | Age: 62
End: 2022-09-13

## 2022-09-14 ENCOUNTER — NURSE TRIAGE (OUTPATIENT)
Dept: OTHER | Facility: CLINIC | Age: 62
End: 2022-09-14

## 2022-09-14 ENCOUNTER — TELEPHONE (OUTPATIENT)
Dept: PRIMARY CARE CLINIC | Age: 62
End: 2022-09-14

## 2022-09-14 RX ORDER — LISINOPRIL 20 MG/1
20 TABLET ORAL DAILY
Qty: 90 TABLET | Refills: 1 | Status: SHIPPED | OUTPATIENT
Start: 2022-09-14 | End: 2022-10-06

## 2022-09-14 NOTE — TELEPHONE ENCOUNTER
Pt called back. States that she has always taken 5 mg- she said she is not sure where the 20 mg started from. She also said that her BP was high again today - around 160/88-  so today, she started taking 2 of the 5 mg Lisinopril. Would like a new Rx sent to pharmacy with the new dosage you want her on as a 90 day supply.

## 2022-09-14 NOTE — TELEPHONE ENCOUNTER
Called pharmacy and pt was taking lisinopril 5 mg prior to starting 40 mg of lisinopril.  Please advise

## 2022-09-14 NOTE — TELEPHONE ENCOUNTER
Pt called in stating that she was seen on the 12th and the doctor increased her lisinopril (PRINIVIL;ZESTRIL) 40 MG tablet   But when she got home she realized that she was only taking 5mg. So she wants to to know what dosage she should be taking at this point. Would like a call back.       [phone]  Please advise

## 2022-09-14 NOTE — TELEPHONE ENCOUNTER
Received call from [facility]  at [facility]- RICARDO with Red Flag Complaint. Subjective: Caller states \" I was in to see my PCP a few days ago. He wanted me to increase my or double my dose of lisinopril. I only take 5 mg- the instructions say to take 40 mg. I just want to make sure what I need to take. [facility] tried to call also. \" - Please see prior TE      Current Symptoms:   +/94 - HR 83   +blood sugar is 109   -denies chest pain   +a \"low grade headache\" - come and goes- started this morning   -denies weakness -denies dizziness, vision problems   +normal shortness of breath due to asthma   +I took 10 mg of lisinopril this morning after BP check     Onset: 3 days ago; unchanged    Associated Symptoms: NA    Pain Severity: 0/10; ;     Temperature: Denies     What has been tried: N/A     LMP: NA Pregnant: NA    Recommended disposition:  Call PCP now - transferred pt to [address] [place] [address] - spoke with [facility] advice provided, patient verbalizes understanding; denies any other questions or concerns; instructed to call back for any new or worsening symptoms. Patient/caller agrees to follow-up with PCP      Attention Provider: Thank you for allowing me to participate in the care of your patient. The patient was connected to triage in response to information provided to the [facility]/PSC. Please do not respond through this encounter as the response is not directed to a shared pool.       Reason for Disposition   [1] Caller has URGENT medicine question about med that PCP or specialist prescribed AND [2] triager unable to answer question    Protocols used: Medication Question Call-ADULT-

## 2022-10-06 ENCOUNTER — OFFICE VISIT (OUTPATIENT)
Dept: PRIMARY CARE CLINIC | Age: 62
End: 2022-10-06
Payer: COMMERCIAL

## 2022-10-06 VITALS
HEART RATE: 73 BPM | BODY MASS INDEX: 31.83 KG/M2 | DIASTOLIC BLOOD PRESSURE: 78 MMHG | TEMPERATURE: 97.3 F | SYSTOLIC BLOOD PRESSURE: 136 MMHG | OXYGEN SATURATION: 97 % | WEIGHT: 174 LBS

## 2022-10-06 DIAGNOSIS — E11.9 TYPE 2 DIABETES MELLITUS WITHOUT COMPLICATION, WITHOUT LONG-TERM CURRENT USE OF INSULIN (HCC): Primary | ICD-10-CM

## 2022-10-06 DIAGNOSIS — I10 ESSENTIAL HYPERTENSION: ICD-10-CM

## 2022-10-06 DIAGNOSIS — Z12.31 BREAST CANCER SCREENING BY MAMMOGRAM: ICD-10-CM

## 2022-10-06 DIAGNOSIS — Z23 FLU VACCINE NEED: ICD-10-CM

## 2022-10-06 DIAGNOSIS — E03.9 HYPOTHYROIDISM, UNSPECIFIED TYPE: ICD-10-CM

## 2022-10-06 PROCEDURE — 3044F HG A1C LEVEL LT 7.0%: CPT | Performed by: INTERNAL MEDICINE

## 2022-10-06 PROCEDURE — 1036F TOBACCO NON-USER: CPT | Performed by: INTERNAL MEDICINE

## 2022-10-06 PROCEDURE — G8427 DOCREV CUR MEDS BY ELIG CLIN: HCPCS | Performed by: INTERNAL MEDICINE

## 2022-10-06 PROCEDURE — 99214 OFFICE O/P EST MOD 30 MIN: CPT | Performed by: INTERNAL MEDICINE

## 2022-10-06 PROCEDURE — 3017F COLORECTAL CA SCREEN DOC REV: CPT | Performed by: INTERNAL MEDICINE

## 2022-10-06 PROCEDURE — G0008 ADMIN INFLUENZA VIRUS VAC: HCPCS | Performed by: INTERNAL MEDICINE

## 2022-10-06 PROCEDURE — G8482 FLU IMMUNIZE ORDER/ADMIN: HCPCS | Performed by: INTERNAL MEDICINE

## 2022-10-06 PROCEDURE — G8417 CALC BMI ABV UP PARAM F/U: HCPCS | Performed by: INTERNAL MEDICINE

## 2022-10-06 PROCEDURE — 2022F DILAT RTA XM EVC RTNOPTHY: CPT | Performed by: INTERNAL MEDICINE

## 2022-10-06 PROCEDURE — 90674 CCIIV4 VAC NO PRSV 0.5 ML IM: CPT | Performed by: INTERNAL MEDICINE

## 2022-10-06 RX ORDER — AMLODIPINE BESYLATE 5 MG/1
5 TABLET ORAL EVERY EVENING
Qty: 90 TABLET | Refills: 1 | Status: SHIPPED | OUTPATIENT
Start: 2022-10-06

## 2022-10-06 RX ORDER — LISINOPRIL 40 MG/1
40 TABLET ORAL DAILY
Qty: 90 TABLET | Refills: 1 | Status: SHIPPED | OUTPATIENT
Start: 2022-10-06

## 2022-10-06 RX ORDER — GLIPIZIDE 5 MG/1
5 TABLET, FILM COATED, EXTENDED RELEASE ORAL DAILY
Qty: 90 TABLET | Refills: 3 | Status: SHIPPED | OUTPATIENT
Start: 2022-10-06

## 2022-10-06 NOTE — PATIENT INSTRUCTIONS
Decrease glipizide from 10/5 AM/PM to 5/2.5 AM/PM  AM and bedtime sugars 6-10 at each time slot, bring record to f/u appt  Increase lisinopril from 20 to 40mg in AM, ADD amlodipine 5mg in PM.  Bring BP record to f/u appt as well  5.    Flu shot today

## 2022-10-06 NOTE — PROGRESS NOTES
10/6/2022    Yanira Trevizo (:  1960) is a 60 y.o. female, here for evaluation of the following medical concerns:    No chief complaint on file. HPI  79-year-old white female, retired peds RN originally from [place], with history of type 2 diabetes, hypertension, hyperlipidemia, hypothyroidism, asthma who has noticed worsening blood pressure control, despite compliance with lisinopril. At time of last visit we increased lisinopril from 5 to 20 mg, with inadequate response. At last visit she noted widely ranging blood sugars at different times of day, often under 80, despite not skipping meals (brunch dinner pattern), but her glucose records were difficult to understand. She brings in an organized table which confirmed the impression that she is overtreated on Trulicity 1.5 glipizide 10/5 immediate release, metformin 1000 mg twice daily. A1c on this regimen in early  was 6.7%, repeat 6.8% last month. Review of Systems   Constitutional: Negative for activity change, appetite change, fatigue and unexpected weight change. HENT: Negative for dental problem, sinus pain, sore throat and trouble swallowing. Eyes: Negative for pain and visual disturbance. Respiratory: Negative for apnea, cough, chest tightness, shortness of breath and wheezing. Cardiovascular: Negative for chest pain and palpitations. Gastrointestinal: Negative for abdominal pain, blood in stool, constipation,  nausea, rectal pain and vomiting. Endocrine: Negative for cold intolerance, heat intolerance, polydipsia, polyphagia and polyuria. Genitourinary: Negative for difficulty urinating, dysuria, flank pain, frequency, hematuria, pelvic pain, urgency, vaginal bleeding and vaginal discharge. Musculoskeletal: Negative for arthralgias, back pain, gait problem, joint swelling, myalgias, neck pain and neck stiffness. Skin: Negative for color change and rash.    Neurological: Negative for dizziness, tremors, syncope, speech difficulty, weakness, light-headedness and headaches. Hematological: Negative for adenopathy. Does not bruise/bleed easily. Psychiatric/Behavioral: Negative for agitation, behavioral problems, decreased concentration, sleep disturbance and suicidal ideas. The patient is not nervous/anxious and is not hyperactive. Prior to Visit Medications    Medication Sig Taking? Authorizing Provider   lisinopril (PRINIVIL;ZESTRIL) 20 MG tablet Take 1 tablet by mouth daily  Jessica Savage MD   metFORMIN (GLUCOPHAGE) 1000 MG tablet Take 1 tablet by mouth 2 times daily (with meals)  Jessica Savage MD   pantoprazole (PROTONIX) 40 MG tablet Take 1 tablet by mouth every morning (before breakfast)  Jessica Savage MD   sucralfate (CARAFATE) 1 GM tablet Take 1 tablet by mouth 3 times daily (before meals)  Jessica Savage MD   Dulaglutide (TRULICITY) 1.5 LI/7.6HJ SOPN ADMINISTER 1.5 MG UNDER THE SKIN 1 TIME A WEEK  Jessica Savage MD   levothyroxine (SYNTHROID) 75 MCG tablet Take 1 tablet by mouth Daily  Jessica Savage MD   vitamin D (CHOLECALCIFEROL) 50 MCG (2000 UT) TABS tablet Take 1 tablet by mouth daily  Jessica Savage MD   albuterol sulfate HFA (PROVENTIL;VENTOLIN;PROAIR) 108 (90 Base) MCG/ACT inhaler Inhale 2 puffs into the lungs every 6 hours as needed for Wheezing  Jessica Savage MD   BREO ELLIPTA 200-25 MCG/INH AEPB inhaler INHALE 1 PUFF BY MOUTH ONCE A DAY  Jessica Savage MD   DULoxetine (CYMBALTA) 60 MG extended release capsule Take 1 capsule by mouth daily  Jessica Savage MD   fluticasone (FLONASE) 50 MCG/ACT nasal spray 1 spray by Nasal route daily  Jessica Savage MD   gabapentin (NEURONTIN) 300 MG capsule Take 1 capsule by mouth 2 times daily for 90 days.   Jessica Savage MD   montelukast (SINGULAIR) 10 MG tablet Take 1 tablet by mouth nightly  Jessica Savage MD   pravastatin (PRAVACHOL) 10 MG tablet Take 1 tablet by mouth daily Take 10 mg by mouth daily  Jessica Savage MD ketoconazole (NIZORAL) 2 % cream Apply topically daily.   Rob Buchanan MD   glipiZIDE (GLUCOTROL) 10 MG tablet Take 10 mg in the morning 5 mg with evening meal.  Rob Buchanan MD   [facility] ULTRA strip USE TO TEST BLOOD SUGAR TWICE DAILY  Historical Provider, MD   OneTouch Delica Lancets 35X MISC USE TO TEST BLOOD SUGAR TWICE DAILY  Historical Provider, MD   hydroxychloroquine (PLAQUENIL) 200 MG tablet Take by mouth daily  Historical Provider, MD        Allergies   Allergen Reactions    Latex      Gloves cause skin irritation and asthma       Past Medical History:   Diagnosis Date    Arthritis     Asthma     Diabetes mellitus (HCC)     Diverticulitis     Fibromyalgia     GERD (gastroesophageal reflux disease)     IBS (irritable bowel syndrome)        Past Surgical History:   Procedure Laterality Date    COLONOSCOPY      HYSTERECTOMY (CERVIX STATUS UNKNOWN)      age 39    LAPAROSCOPY      OVARY REMOVAL      age 43       Social History     Socioeconomic History    Marital status:      Spouse name: Not on file    Number of children: Not on file    Years of education: Not on file    Highest education level: Not on file   Occupational History    Not on file   Tobacco Use    Smoking status: Never    Smokeless tobacco: Never   Substance and Sexual Activity    Alcohol use: Yes     Comment: rare    Drug use: No    Sexual activity: Not on file   Other Topics Concern    Not on file   Social History Narrative    Not on file     Social Determinants of Health     Financial Resource Strain: Low Risk     Difficulty of Paying Living Expenses: Not hard at all   Food Insecurity: No Food Insecurity    Worried About Running Out of Food in the Last Year: Never true    Ran Out of Food in the Last Year: Never true   Transportation Needs: Not on file   Physical Activity: Not on file   Stress: Not on file   Social Connections: Not on file   Intimate Partner Violence: Not on file   Housing Stability: Not on file        Family History   Problem Relation Age of Onset    Breast Cancer Paternal Aunt     Breast Cancer Other     Ovarian Cancer Other     Ovarian Cancer Other     Ovarian Cancer Other     Ovarian Cancer Other        There were no vitals filed for this visit. Estimated body mass index is 31.64 kg/m² as calculated from the following:    Height as of 3/30/22: 5' 2\" (1.575 m). Weight as of 9/12/22: 173 lb (78.5 kg). PHYSICAL EXAM  GENERAL:  Pleasant  female who looks her stated age, awake alert and oriented x3, no acute distress. PSYCH:  No psychomotor retardation or agitation. Good eye contact. Unrestricted affect range. Mood congruent with affect. Garrulous, occasionally tangential thought. LABS  Lab Review   Office Visit on 09/12/2022   Component Date Value    Hemoglobin A1C 09/12/2022 6.8    Orders Only on 03/30/2022   Component Date Value    Lyme, EIA 03/30/2022 0.15    Hospital Outpatient Visit on 02/28/2022   Component Date Value    C.diff Toxin/Antigen 02/28/2022                      Value:Negative for Clostridium difficile antigen and toxin  Normal Range: Negative     Hospital Outpatient Visit on 02/26/2022   Component Date Value    Microalbumin, Random Uri* 02/26/2022 <1.20     Creatinine, Ur 02/26/2022 69.3     Microalbumin Creatinine * 02/26/2022 see below    Orders Only on 02/25/2022   Component Date Value    TSH 02/25/2022 1.55     Sodium 02/25/2022 140     Potassium 02/25/2022 4.1     Chloride 02/25/2022 102     CO2 02/25/2022 23     Anion Gap 02/25/2022 15     Glucose 02/25/2022 117 (A)     BUN 02/25/2022 18     Creatinine 02/25/2022 0.5 (A)     GFR Non- 02/25/2022 >60     GFR  02/25/2022 >60     Calcium 02/25/2022 9.3     Hemoglobin A1C 02/25/2022 6.7     eAG 02/25/2022 145. 6     Cholesterol, Total 02/25/2022 156     Triglycerides 02/25/2022 71     HDL 02/25/2022 58     LDL Calculated 02/25/2022 84     VLDL Cholesterol Calcula* 02/25/2022 14     AST 02/25/2022 16     ALT 02/25/2022 16     Vit D, 25-Hydroxy 02/25/2022 47.5    Admission on 02/08/2022, Discharged on 02/08/2022   Component Date Value    Ventricular Rate 02/08/2022 77     Atrial Rate 02/08/2022 77     P-R Interval 02/08/2022 130     QRS Duration 02/08/2022 86     Q-T Interval 02/08/2022 400     QTc Calculation (Bazett) 02/08/2022 452     P Axis 02/08/2022 37     R [place] 02/08/2022 14     T Axis 02/08/2022 23     Diagnosis 02/08/2022 Normal sinus rhythmConfirmed by Marisabel Ayoub ([id number]) on 2/10/2022 5:46:15 PM     WBC 02/08/2022 7.7     RBC 02/08/2022 4.34     Hemoglobin 02/08/2022 11.9 (A)     Hematocrit 02/08/2022 36.8     MCV 02/08/2022 84.8     MCH 02/08/2022 27.5     MCHC 02/08/2022 32.4     RDW 02/08/2022 13.7     Platelets 48/19/3067 367     MPV 02/08/2022 6.7     Neutrophils % 02/08/2022 70.0     Lymphocytes % 02/08/2022 18.5     Monocytes % 02/08/2022 8.7     Eosinophils % 02/08/2022 1.7     Basophils % 02/08/2022 1.1     Neutrophils Absolute 02/08/2022 5.4     Lymphocytes Absolute 02/08/2022 1.4     Monocytes Absolute 02/08/2022 0.7     Eosinophils Absolute 02/08/2022 0.1     Basophils Absolute 02/08/2022 0.1     Rejected Test 02/08/2022 bmpx,trop     Reason for Rejection 02/08/2022 see below     Sodium 02/08/2022 139     Potassium reflex Magnesi* 02/08/2022 4.1     Chloride 02/08/2022 100     CO2 02/08/2022 26     Anion Gap 02/08/2022 13     Glucose 02/08/2022 114 (A)     BUN 02/08/2022 21 (A)     Creatinine 02/08/2022 <0.5 (A)     GFR Non- 02/08/2022 >60     GFR  02/08/2022 >60     Calcium 02/08/2022 9.1     Troponin 02/08/2022 <0.01     Color, UA 02/08/2022 YELLOW     Clarity, UA 02/08/2022 Clear     Glucose, Ur 02/08/2022 Negative     Bilirubin Urine 02/08/2022 Negative     Ketones, Urine 02/08/2022 Negative     Specific Gravity, UA 02/08/2022 1.027     Blood, Urine 02/08/2022 Negative     pH, UA 02/08/2022 5.5     Protein, UA 02/08/2022 Negative Urobilinogen, Urine 02/08/2022 0.2     Nitrite, Urine 02/08/2022 Negative     Leukocyte Esterase, Urine 02/08/2022 Negative     Microscopic Examination 02/08/2022 Not Indicated     Urine Type 02/08/2022 Voided     Urine Reflex to Culture 02/08/2022 Not Indicated          ASSESSMENT/PLAN    1. Type 2 diabetes mellitus without complication, without long-term current use of insulin ([facility])  Last year up titration for suboptimal diabetes control rising A1c, with Metformin currently at 1000 twice daily, Trullicity at 1.5 mg/week, glipizide 10/5. On this regimen glycemic control is worrisome:    AM: 20% of sugars under 80, 30% over 140, with 40% within goal of 100-1 40. A1c down to 6.8% t unchanged from 6.7% February 2022. PM: 50% of sugars under 130% under 80  Bedtime: 20% under 80, 50% under 100, 20% over 140. At this time we will discontinue immediate release glipizide 15 mg total daily dose. We will start extended release 5 mg glipizide in the morning with cynthia at the same time she takes her metformin. Given her irritable bowel and based on her glycemic response, may adjust Trulicity and/or metformin, see below. 2. Mild persistent asthma, unspecified whether complicated  Prescribed Singulair, flonase nasal spray, Breo Ellipta (LABA/steroid). Wonder if reflux component, less albuterol since PPI restarted. Seasonal variation. 3. Diarrhea, unspecified type  4. Irritable bowel syndrome with diarrhea    Patient provides longstanding history of diarrhea predominant irritable bowel, normally 3-5 times per day not awakening her from sleep and denies history of lactose intolerance. Either her PCP or rheumatologist checked sed rate, TTG both normal December 6, 2021 before her pneumonia/antibiotics. In late 2021, former PCP increased Metformin and Trulicity, may be contributing. Even Cymbalta potentially culprit.     Patient states prior colonoscopy in [place] after diverticulitis 2017, then 2021 EGD/Colonoscopy for IBS dysphagia, evidently without evidence of inflammatory bowel disease or lymphocytic colitis though I have yet to receive this information. We also ruled out C. difficile early 2022.      5. Hypertension, essential  Reassuring BMP, inadequate control lisinopril 20. Increase lisinopril to 40mg for bp running 140-150/80-90, and add amlodipine 5 mg in the evening to stagger peak trough effects and improve nocturnal supine hypertension control. Patient to update us at follow-up visit to blood pressure response. 6. Hypothyroidism, unspecified type  TSH consistent with adequate repletion on 75 mcg Synthroid. Recheck 1 year. 7. Mixed hyperlipidemia  Prescribed low-dose pravastatin, note prior history fibromyalgia. Recheck February 2023.    8. Routine adult health maintenance  Only [facility] primary series, encouraged hybrid booster. EGD colonoscopy 2021, results unknown. Mammogram September 2022. Status post hysterectomy therefore no Pap. DEXA March 2022 -1.3 spine, -1.2 worst hip consistent with mild osteopenia, with excellent vitamin D 42 in 2022. TPACK Hep C screen -2022. Flu shot provided today. Discuss Tdap  Shingrix at follow-up. 9.  Stated history of memory problem  Ricardo 37 of 38 points, 2022. Suspect either mild attention issues such as ADD or dyslexia, anxiety, but no obvious cognitive deficit. 14. Fibromyalgia  Followed in rheumatology. Only briefly on Plaquenil. Do not have results of any rheumatology work-up other than normal sed rate December 2021. Currently on Cymbalta 60 mg at bedtime and gabapentin 300 twice daily. 15. Pulmonary nodules  February 8, 2022 chest CT. Mild bilateral hilar lymphadenopathy, precarinal lymph node, patchy infiltrate left upper and lower lobes presumed remnant of prior pneumonia. NonSmoker, 3 to 6 mm nodules left  upper lobe, for which obtain follow-up CT in 3 months, May 2022, repeat in 18 months if indicated.     16. Dupuytren contracture  Seeing a hand surgeon. 17.  Dyspepsia. Irritable bowel history. Chronic NSAID use. Discomfort despite prior PPI, no EtOH. No melena or solid food dysphagia. Not taking Mobic and ibuprofen concurrently. Wonder about H. pylori, Metformin and or Trulicity side effect. Evidently underwent EGD, results unknown; however patient feels that PPI plus Carafate is helping indeed she has not needed Zofran since starting these medications. 18.  Bullous tinea pedis. Brisk capillary refill. Nizoral prescription provided to good effect. Asked to use gauze between toes. No follow-ups on file. It was a pleasure to visit with Ms. Elian Stevens today. Answered all questions as best I could.   Lisbet Maria MD   Time 30 minutes

## 2022-10-11 DIAGNOSIS — R22.31 LUMP IN ARMPIT, RIGHT: Primary | ICD-10-CM

## 2022-11-08 ENCOUNTER — OFFICE VISIT (OUTPATIENT)
Dept: PRIMARY CARE CLINIC | Age: 62
End: 2022-11-08
Payer: COMMERCIAL

## 2022-11-08 VITALS
BODY MASS INDEX: 31.46 KG/M2 | OXYGEN SATURATION: 98 % | WEIGHT: 172 LBS | HEART RATE: 77 BPM | SYSTOLIC BLOOD PRESSURE: 124 MMHG | DIASTOLIC BLOOD PRESSURE: 70 MMHG | TEMPERATURE: 97.3 F

## 2022-11-08 DIAGNOSIS — E11.9 TYPE 2 DIABETES MELLITUS WITHOUT COMPLICATION, WITHOUT LONG-TERM CURRENT USE OF INSULIN (HCC): Primary | ICD-10-CM

## 2022-11-08 DIAGNOSIS — R20.2 PARESTHESIA OF FOOT, BILATERAL: ICD-10-CM

## 2022-11-08 DIAGNOSIS — J45.40 MODERATE PERSISTENT ASTHMA WITHOUT COMPLICATION: ICD-10-CM

## 2022-11-08 DIAGNOSIS — E78.00 PURE HYPERCHOLESTEROLEMIA: ICD-10-CM

## 2022-11-08 DIAGNOSIS — E03.9 ACQUIRED HYPOTHYROIDISM: ICD-10-CM

## 2022-11-08 DIAGNOSIS — Z12.83 SKIN CANCER SCREENING: ICD-10-CM

## 2022-11-08 DIAGNOSIS — Z79.899 LONG-TERM USE OF PLAQUENIL: ICD-10-CM

## 2022-11-08 DIAGNOSIS — B37.2 SKIN CANDIDIASIS: ICD-10-CM

## 2022-11-08 DIAGNOSIS — I10 ESSENTIAL HYPERTENSION: ICD-10-CM

## 2022-11-08 PROCEDURE — G8482 FLU IMMUNIZE ORDER/ADMIN: HCPCS | Performed by: INTERNAL MEDICINE

## 2022-11-08 PROCEDURE — G8417 CALC BMI ABV UP PARAM F/U: HCPCS | Performed by: INTERNAL MEDICINE

## 2022-11-08 PROCEDURE — 3078F DIAST BP <80 MM HG: CPT | Performed by: INTERNAL MEDICINE

## 2022-11-08 PROCEDURE — G8427 DOCREV CUR MEDS BY ELIG CLIN: HCPCS | Performed by: INTERNAL MEDICINE

## 2022-11-08 PROCEDURE — 1036F TOBACCO NON-USER: CPT | Performed by: INTERNAL MEDICINE

## 2022-11-08 PROCEDURE — 99214 OFFICE O/P EST MOD 30 MIN: CPT | Performed by: INTERNAL MEDICINE

## 2022-11-08 PROCEDURE — 3074F SYST BP LT 130 MM HG: CPT | Performed by: INTERNAL MEDICINE

## 2022-11-08 PROCEDURE — 3017F COLORECTAL CA SCREEN DOC REV: CPT | Performed by: INTERNAL MEDICINE

## 2022-11-08 PROCEDURE — 2022F DILAT RTA XM EVC RTNOPTHY: CPT | Performed by: INTERNAL MEDICINE

## 2022-11-08 PROCEDURE — 3044F HG A1C LEVEL LT 7.0%: CPT | Performed by: INTERNAL MEDICINE

## 2022-11-08 RX ORDER — KETOCONAZOLE 20 MG/G
CREAM TOPICAL
Qty: 60 G | Refills: 3 | Status: SHIPPED | OUTPATIENT
Start: 2022-11-08

## 2022-11-08 RX ORDER — PRAVASTATIN SODIUM 20 MG
20 TABLET ORAL DAILY
Qty: 90 TABLET | Refills: 3 | Status: SHIPPED | OUTPATIENT
Start: 2022-11-08

## 2022-11-08 ASSESSMENT — PATIENT HEALTH QUESTIONNAIRE - PHQ9
2. FEELING DOWN, DEPRESSED OR HOPELESS: 0
SUM OF ALL RESPONSES TO PHQ QUESTIONS 1-9: 0
SUM OF ALL RESPONSES TO PHQ QUESTIONS 1-9: 0
1. LITTLE INTEREST OR PLEASURE IN DOING THINGS: 0
SUM OF ALL RESPONSES TO PHQ QUESTIONS 1-9: 0
SUM OF ALL RESPONSES TO PHQ9 QUESTIONS 1 & 2: 0
SUM OF ALL RESPONSES TO PHQ QUESTIONS 1-9: 0

## 2022-11-08 ASSESSMENT — ENCOUNTER SYMPTOMS
VISUAL CHANGE: 0
BLURRED VISION: 0
RESPIRATORY NEGATIVE: 1
EYES NEGATIVE: 1

## 2022-11-08 NOTE — PROGRESS NOTES
Judy Beck (:  1960) is a 60 y.o. female,Established patient, here for evaluation of the following chief we will send for(s):  Diabetes         ASSESSMENT/PLAN:  1. Type 2 diabetes mellitus without complication, without long-term current use of insulin (HCC) has been under good control glipizide, metformin and Trulicity. Discussed with patient increasing Trulicity to 3 mg weekly. Patient will think about it let me and check urine microalbumin. Lab Results   Component Value Date    LABA1C 6.8 2022    LABA1C 6.7 2022     Lab Results   Component Value Date    LABMICR <1.20 2022    LDLCALC 84 2022    CREATININE 0.5 (L) 2022      -     Urinalysis with Microscopic; Future  -     Microalbumin / Creatinine Urine Ratio; Future  -     AFL - Elsie Sifuentes MD, Dermatology, [facility]  2. Acquired hypothyroidism clinically euthyroid on levothyroxine 75 MCG daily we will check TSH to further evaluate  -     TSH with Reflex to FT4; Future  3. Essential hypertension under good control with lisinopril 40 mg amlodipine 5 mg daily, continue monitor renal function  -     Comprehensive Metabolic Panel; Future  4. Skin cancer screening multiple freckling. We will send for skin cancer screening  -     Trae Lynch MD, Dermatology, [facility]  5. Long-term use of Plaquenil which is helping arthritis prescribed per her rheumatologist patient reminded needs to see ophthalmology twice a year referral given  -     AFL - Angelica Bear MD, Ophthalmology, [facility]  6. Skin candidiasis under breasts intermittently will give ketoconazole cream by hand. -     ketoconazole (NIZORAL) 2 % cream; Apply topically daily. , Disp-60 g, R-3, Normal  7. Paresthesia of foot, bilateral paresthesias of feet intermittently sound like diabetic neuropathy will get EMG continue current regimen and hopefully patient will agree to increasing Trulicity. -     EMG; Future  8.    Pure hypercholesterolemia  DL goal is 70 and patient is at 84 we will increase pravastatin from 10-20 mg daily. Lab Results   Component Value Date    CHOL 156 02/25/2022     Lab Results   Component Value Date    TRIG 71 02/25/2022     Lab Results   Component Value Date    HDL 58 02/25/2022     Lab Results   Component Value Date    LDLCALC 84 02/25/2022     Lab Results   Component Value Date    LABVLDL 14 02/25/2022     No results found for: CHOLHDLRATIO   Return in about 3 months (around 2/8/2023). 9.  Moderate persistent asthma under good control with Breo and Singulair milligrams nightly. Continue. No depression complaints with Singulair. Subjective   SUBJECTIVE/OBJECTIVE:  Home fasting glucose of 94      Hypothyroidism: Patient presents for evaluation of thyroid function. Symptoms consist of denies fatigue, weight changes, heat/cold intolerance, bowel/skin changes or CVS symptoms. Symptoms have present for 0 months. The symptoms are no. The problem has been stable. Previous thyroid studies include TSH. The hypothyroidism is due to hypothyroidism and Hypothyroidism. Diabetes  She presents for her follow-up diabetic visit. She has type 2 diabetes mellitus. The initial diagnosis of diabetes was made 5 years ago. Her disease course has been stable. Pertinent negatives for hypoglycemia include no confusion, dizziness, headaches, hunger, mood changes, nervousness/anxiousness, pallor, seizures, sleepiness, speech difficulty, sweats or tremors. Pertinent negatives for diabetes include no blurred vision, no chest pain, no fatigue, no foot paresthesias, no foot ulcerations, no polydipsia, no polyphagia, no polyuria, no visual change and no weakness. There are no hypoglycemic complications. Symptoms are stable. Pertinent negatives for diabetic complications include no CVA or heart disease.  Risk factors for coronary artery disease include diabetes mellitus, dyslipidemia, hypertension and post-menopausal. She is compliant with treatment most of the time. Meal planning includes avoidance of concentrated sweets. An ACE inhibitor/angiotensin II receptor blocker is being taken. Eye exam is not current. Review of Systems   Constitutional: Negative. Negative for fatigue. Eyes: Negative. Negative for blurred vision. No eye symptoms but overdue for eye exam for diabetes and chronic Plaquenil therapy   Respiratory: Negative. Moderate persistent asthma with good control. Cardiovascular: Negative. Negative for chest pain. Essential hypertension   Endocrine: Negative for polydipsia, polyphagia and polyuria. Type 2 diabetes non-insulin-requiring and acquired hypothyroidism, hyperlipidemia   Genitourinary: Negative. Musculoskeletal: Negative. Skin:  Negative for pallor. Neurological:  Negative for dizziness, tremors, seizures, speech difficulty, weakness and headaches. Hematological: Negative. Psychiatric/Behavioral:  Negative for confusion. The patient is not nervous/anxious. Objective   Physical Exam  Eyes:      Extraocular Movements: Extraocular movements intact. Conjunctiva/sclera: Conjunctivae normal.      Pupils: Pupils are equal, round, and reactive to light. Cardiovascular:      Pulses: Normal pulses. Heart sounds: Normal heart sounds. Pulmonary:      Effort: Pulmonary effort is normal.      Breath sounds: Normal breath sounds. Abdominal:      General: Abdomen is flat. Bowel sounds are normal. There is no distension. Palpations: Abdomen is soft. There is no mass. Tenderness: There is no abdominal tenderness. Hernia: No hernia is present. Genitourinary:     Comments: No breast lumps or axillary adenopathy, getting diagnostic mammo for left breast pain . Musculoskeletal:      Cervical back: Normal range of motion and neck supple. Skin:     General: Skin is warm and dry.       Comments: Candida under breast , needs skin cancer screen Neurological:      General: No focal deficit present. Mental Status: She is alert and oriented to person, place, and time. Cranial Nerves: No cranial nerve deficit. Sensory: No sensory deficit. Gait: Gait normal.   Psychiatric:         Mood and Affect: Mood normal.         Behavior: Behavior normal.         Thought Content: Thought content normal.         Judgment: Judgment normal.      Visual inspection:  Deformity/amputation: absent  Skin lesions/pre-ulcerative calluses: absent  Edema: right- negative, left- negative    Sensory exam:  Monofilament sensation: normal  (minimum of 5 random plantar locations tested, avoiding callused areas - > 1 area with absence of sensation is + for neuropathy)    Plus at least one of the following:  Pulses: normal,   Pinprick: Intact  Proprioception: Intact  Vibration (128 Hz): N/A           An electronic signature was used to authenticate this note.     --Jacques Lagunas MD

## 2022-11-08 NOTE — PATIENT INSTRUCTIONS
Take calcium 600 mg and magnesium 200 mg twice a day    Vitamin D 2,000 units    Super juice, it is 2 tablets daily, one month supply $8.00, 45 different fruits and vegetable. Flash frozen and dehydrated into two tablets. It it the amounts of anti-oxidants that you get in 5 serving of fruits and vegetables. Memory food, salmon and blueberries. Get a serving daily.

## 2022-12-16 ENCOUNTER — HOSPITAL ENCOUNTER (EMERGENCY)
Age: 62
Discharge: HOME | End: 2022-12-16
Attending: EMERGENCY MEDICINE
Payer: COMMERCIAL

## 2022-12-16 ENCOUNTER — APPOINTMENT (OUTPATIENT)
Dept: GENERAL RADIOLOGY | Age: 62
End: 2022-12-16
Payer: COMMERCIAL

## 2022-12-16 VITALS
OXYGEN SATURATION: 94 % | RESPIRATION RATE: 18 BRPM | HEART RATE: 81 BPM | SYSTOLIC BLOOD PRESSURE: 128 MMHG | HEIGHT: 62 IN | TEMPERATURE: 98.5 F | BODY MASS INDEX: 31.28 KG/M2 | DIASTOLIC BLOOD PRESSURE: 69 MMHG | WEIGHT: 170 LBS

## 2022-12-16 DIAGNOSIS — R07.81 RIB PAIN: ICD-10-CM

## 2022-12-16 DIAGNOSIS — R07.89 ACUTE CHEST WALL PAIN: Primary | ICD-10-CM

## 2022-12-16 DIAGNOSIS — S29.9XXA CHEST WALL INJURY, INITIAL ENCOUNTER: ICD-10-CM

## 2022-12-16 LAB
EKG ATRIAL RATE: 81 BPM
EKG DIAGNOSIS: NORMAL
EKG P AXIS: 50 DEGREES
EKG P-R INTERVAL: 132 MS
EKG Q-T INTERVAL: 402 MS
EKG QRS DURATION: 94 MS
EKG QTC CALCULATION (BAZETT): 466 MS
EKG R AXIS: 15 DEGREES
EKG T AXIS: 60 DEGREES
EKG VENTRICULAR RATE: 81 BPM

## 2022-12-16 PROCEDURE — 6370000000 HC RX 637 (ALT 250 FOR IP): Performed by: EMERGENCY MEDICINE

## 2022-12-16 PROCEDURE — 99284 EMERGENCY DEPT VISIT MOD MDM: CPT

## 2022-12-16 PROCEDURE — 93005 ELECTROCARDIOGRAM TRACING: CPT | Performed by: EMERGENCY MEDICINE

## 2022-12-16 PROCEDURE — 93010 ELECTROCARDIOGRAM REPORT: CPT | Performed by: INTERNAL MEDICINE

## 2022-12-16 PROCEDURE — 71046 X-RAY EXAM CHEST 2 VIEWS: CPT

## 2022-12-16 RX ORDER — HYDROCODONE BITARTRATE AND ACETAMINOPHEN 5; 325 MG/1; MG/1
1 TABLET ORAL ONCE
Status: COMPLETED | OUTPATIENT
Start: 2022-12-16 | End: 2022-12-16

## 2022-12-16 RX ORDER — HYDROCODONE BITARTRATE AND ACETAMINOPHEN 5; 325 MG/1; MG/1
1 TABLET ORAL EVERY 6 HOURS PRN
Qty: 12 TABLET | Refills: 0 | Status: SHIPPED | OUTPATIENT
Start: 2022-12-16 | End: 2022-12-19

## 2022-12-16 RX ADMIN — HYDROCODONE BITARTRATE AND ACETAMINOPHEN 1 TABLET: 5; 325 TABLET ORAL at 07:12

## 2022-12-16 ASSESSMENT — PAIN SCALES - GENERAL
PAINLEVEL_OUTOF10: 9
PAINLEVEL_OUTOF10: 2

## 2022-12-16 NOTE — ED NOTES
Instruction given on incentive spirometer     Grace Marie, [facility]  12/16/22 [id number]

## 2022-12-16 NOTE — ED PROVIDER NOTES
[address] [facility] PROVIDER NOTE    Patient Identification  Pt Name: Sheyla Shea  MRN: [id number]  [place] 1960  Date of evaluation: 12/16/2022  Provider: Catherine Stauffer MD  PCP: Yash Hayes MD    Chief Complaint  Rib Pain (injury) (Pt via self from home, pt was reaching for ham and felt a pop in her left upper ribs, has hx of rib fractures from coughing, per patient)      HPI  (History provided by patient)  This is a 60 y.o. female who was brought in by self for chest wall pain. The patient reports she was reaching for a ham in the back of her refrigerator when she felt a sudden, sharp, \"pop\" pain in her left upper and left lower chest wall. This occurred over the weekend, on either Friday or Saturday. She has had severe pain to the left upper and left lower chest walls worse with deep breathing and movement. The lower pain seems to be improving, but the upper pain seems to be getting worse. She has been using her nebulizer machine more for the last several days due to increased shortness of breath and dry cough similar when her asthma has been worse in the past. She denies fever and chills, but has been experiencing night sweats. She denies abdominal pain, but says has intermittent chronic tenderness in her abdomen due to her IBS. She feels her IBS has been exacerbated over the last couple of days. ROS  10 systems reviewed, pertinent positives/negatives per HPI otherwise noted to be negative.     I have reviewed the following nursing documentation:  Allergies: Latex    Past medical history:   Past Medical History:   Diagnosis Date    Arthritis     Asthma     Diabetes mellitus ([facility] [address]     Diverticulitis     Fibromyalgia     GERD (gastroesophageal reflux disease)     IBS (irritable bowel syndrome)      Past surgical history:   Past Surgical History:   Procedure Laterality Date    COLONOSCOPY      HYSTERECTOMY (CERVIX STATUS UNKNOWN)      age 39    LAPAROSCOPY OVARY REMOVAL      age 43       Home medications:   Previous Medications    ALBUTEROL SULFATE HFA (PROVENTIL;VENTOLIN;PROAIR) 108 (90 BASE) MCG/ACT INHALER    Inhale 2 puffs into the lungs every 6 hours as needed for Wheezing    AMLODIPINE (NORVASC) 5 MG TABLET    Take 1 tablet by mouth every evening    BREO ELLIPTA 200-25 MCG/INH AEPB INHALER    INHALE 1 PUFF BY MOUTH ONCE A DAY    DULAGLUTIDE (TRULICITY) 1.5 LH/9.3YP SOPN    ADMINISTER 1.5 MG UNDER THE SKIN 1 TIME A WEEK    DULOXETINE (CYMBALTA) 60 MG EXTENDED RELEASE CAPSULE    Take 1 capsule by mouth daily    FLUTICASONE (FLONASE) 50 MCG/ACT NASAL SPRAY    1 spray by Nasal route daily    GABAPENTIN (NEURONTIN) 300 MG CAPSULE    Take 1 capsule by mouth 2 times daily for 90 days. GLIPIZIDE (GLUCOTROL XL) 5 MG EXTENDED RELEASE TABLET    Take 1 tablet by mouth daily    HYDROXYCHLOROQUINE (PLAQUENIL) 200 MG TABLET    Take by mouth daily    KETOCONAZOLE (NIZORAL) 2 % CREAM    Apply topically daily. LEVOTHYROXINE (SYNTHROID) 75 MCG TABLET    Take 1 tablet by mouth Daily    LISINOPRIL (PRINIVIL;ZESTRIL) 40 MG TABLET    Take 1 tablet by mouth daily    METFORMIN (GLUCOPHAGE) 1000 MG TABLET    Take 1 tablet by mouth 2 times daily (with meals)    MONTELUKAST (SINGULAIR) 10 MG TABLET    Take 1 tablet by mouth nightly    ONETOUCH DELICA LANCETS 30A MISC    USE TO TEST BLOOD SUGAR TWICE DAILY    ONETOUCH ULTRA STRIP    USE TO TEST BLOOD SUGAR TWICE DAILY    PANTOPRAZOLE (PROTONIX) 40 MG TABLET    Take 1 tablet by mouth every morning (before breakfast)    PRAVASTATIN (PRAVACHOL) 20 MG TABLET    Take 1 tablet by mouth daily    SUCRALFATE (CARAFATE) 1 GM TABLET    Take 1 tablet by mouth 3 times daily (before meals)    VITAMIN D (CHOLECALCIFEROL) 50 MCG (2000 UT) TABS TABLET    Take 1 tablet by mouth daily       Social history:  reports that she has never smoked. She has never used smokeless tobacco. She reports current alcohol use.  She reports that she does not use drugs. Family history:    Family History   Problem Relation Age of Onset    Breast Cancer Paternal Aunt     Breast Cancer Other     Ovarian Cancer Other     Ovarian Cancer Other     Ovarian Cancer Other     Ovarian Cancer Other        Exam  ED Triage Vitals [12/16/22 0642]   BP Temp Temp Source Heart Rate Resp SpO2 Height Weight   (!) 125/102 98.5 °F (36.9 °C) Oral 90 22 97 % 5' 2\" (1.575 m) 170 lb (77.1 kg)     Nursing note and vitals reviewed. Constitutional: Well developed, well nourished. Non-toxic in appearance. HENT:      Head: Normocephalic and atraumatic. Ears: External ears normal.      Nose: Nose normal.     Mouth: Membrane mucosa moist and pink. Eyes: Anicteric sclera. No discharge. Neck: Supple. Trachea midline. Cardiovascular: RRR; no murmurs, rubs, or gallops. Pulmonary/Chest: Effort normal. No respiratory distress. CTAB. No stridor. No wheezes. No rales. Chest wall severely tender to palpation over the left mid and lower anterolateral chest wall without palpable rib deformity or crepitus. Abdominal: Soft. No distension. Tender to palpation in the left abdomen (chronic per patient)  Musculoskeletal: Moves all extremities. No gross deformity. No lower extremity edema. Neurological: Alert and oriented. Face symmetric. Speech is clear. Skin: Warm and dry. No rash. Psychiatric: Normal mood and affect. Behavior is normal.    EKG  The Ekg interpreted by me in the absence of a cardiologist shows. normal sinus rhythm with a rate of 81  Axis is   Normal  QTc is  normal  Intervals and Durations are unremarkable. No specific ST-T wave changes appreciated. No evidence of acute ischemia. No significant change from prior EKG dated 2/8/2022      Radiology  XR CHEST (2 VW)   Final Result   No convincing acute cardiopulmonary abnormality. MDM and ED Course  Patient's presentation and exam are strongly suggestive of a chest wall injury, possibly rib fracture.   It is incredibly unlikely she had an underlying physiologic cause such as ACS or pulmonary embolism given the focal nature of her pain, the reproducibility on palpation of her chest wall, and no worsening with deep breathing. Lab work-up was not indicated. Although unlikely pain is related to her heart, I performed an EKG, which showed no evidence of acute ischemia, infarction, or other abnormality. Chest x-ray revealed no evidence of fracture, pneumothorax, or other associated injury. Further, there is no evidence of pneumonia, although I have low suspicion for this given lack of pneumonia symptoms. Pain improved significantly with hydrocodone. I have prescribed a small amount for home to treat her symptoms. I also instructed the patient on incentive spirometry. Education provided by her nurse. We thoroughly discussed the signs and symptoms to return for, including fever, chills, worsening pain, or new symptoms. Patient acknowledges and understands. Advise she follow-up with her primary care physician for reevaluation to ensure her symptoms are improving. Final Impression  1. Acute chest wall pain    2. Chest wall injury, initial encounter    3. Rib pain        Blood pressure (!) 125/102, pulse 90, temperature 98.5 °F (36.9 °C), temperature source Oral, resp. rate 22, height 5' 2\" (1.575 m), weight 170 lb (77.1 kg), SpO2 97 %. Disposition:  DISPOSITION Decision To Discharge 12/16/2022 07:38:47 AM      Patient Referrals:  [facility] Emergency Department  [address]  [phone]    As needed, If symptoms worsen or new symptoms develop    Warren Maki, [address] [facility]  [phone]    In 2 days  for re-evaluation    Discharge Medications:  New Prescriptions    HYDROCODONE-ACETAMINOPHEN (LORCET) 5-325 MG PER TABLET    Take 1 tablet by mouth every 6 hours as needed for Pain for up to 3 days. Intended supply: 3 days.  Take lowest dose possible to manage pain       This chart was generated using the [address] [place] [address] dictation system. I created this record but it may contain dictation errors given the limitations of this technology.        Matthias Cole MD  12/16/22 3301

## 2022-12-22 DIAGNOSIS — N64.4 BREAST PAIN, LEFT: Primary | ICD-10-CM

## 2022-12-30 DIAGNOSIS — R92.8 ABNORMAL MAMMOGRAM OF RIGHT BREAST: Primary | ICD-10-CM

## 2023-03-09 RX ORDER — DULAGLUTIDE 1.5 MG/.5ML
INJECTION, SOLUTION SUBCUTANEOUS
Qty: 6 ML | Refills: 1 | Status: SHIPPED | OUTPATIENT
Start: 2023-03-09

## 2023-04-19 ENCOUNTER — OFFICE VISIT (OUTPATIENT)
Dept: PRIMARY CARE CLINIC | Age: 63
End: 2023-04-19
Payer: MEDICARE

## 2023-04-19 VITALS
WEIGHT: 175 LBS | HEART RATE: 100 BPM | SYSTOLIC BLOOD PRESSURE: 125 MMHG | DIASTOLIC BLOOD PRESSURE: 71 MMHG | BODY MASS INDEX: 32.01 KG/M2 | OXYGEN SATURATION: 95 % | TEMPERATURE: 98.4 F

## 2023-04-19 DIAGNOSIS — E78.00 PURE HYPERCHOLESTEROLEMIA: ICD-10-CM

## 2023-04-19 DIAGNOSIS — Z12.83 SKIN CANCER SCREENING: ICD-10-CM

## 2023-04-19 DIAGNOSIS — J45.40 MODERATE PERSISTENT ASTHMA WITHOUT COMPLICATION: ICD-10-CM

## 2023-04-19 DIAGNOSIS — Z12.11 COLON CANCER SCREENING: ICD-10-CM

## 2023-04-19 DIAGNOSIS — E11.9 TYPE 2 DIABETES MELLITUS WITHOUT COMPLICATION, WITHOUT LONG-TERM CURRENT USE OF INSULIN (HCC): Primary | ICD-10-CM

## 2023-04-19 DIAGNOSIS — E03.9 ACQUIRED HYPOTHYROIDISM: ICD-10-CM

## 2023-04-19 LAB — HBA1C MFR BLD: 6.9 %

## 2023-04-19 PROCEDURE — 3017F COLORECTAL CA SCREEN DOC REV: CPT | Performed by: INTERNAL MEDICINE

## 2023-04-19 PROCEDURE — G8417 CALC BMI ABV UP PARAM F/U: HCPCS | Performed by: INTERNAL MEDICINE

## 2023-04-19 PROCEDURE — 83036 HEMOGLOBIN GLYCOSYLATED A1C: CPT | Performed by: INTERNAL MEDICINE

## 2023-04-19 PROCEDURE — 1036F TOBACCO NON-USER: CPT | Performed by: INTERNAL MEDICINE

## 2023-04-19 PROCEDURE — G8427 DOCREV CUR MEDS BY ELIG CLIN: HCPCS | Performed by: INTERNAL MEDICINE

## 2023-04-19 PROCEDURE — 2022F DILAT RTA XM EVC RTNOPTHY: CPT | Performed by: INTERNAL MEDICINE

## 2023-04-19 PROCEDURE — 3044F HG A1C LEVEL LT 7.0%: CPT | Performed by: INTERNAL MEDICINE

## 2023-04-19 PROCEDURE — 3078F DIAST BP <80 MM HG: CPT | Performed by: INTERNAL MEDICINE

## 2023-04-19 PROCEDURE — 99214 OFFICE O/P EST MOD 30 MIN: CPT | Performed by: INTERNAL MEDICINE

## 2023-04-19 PROCEDURE — 3074F SYST BP LT 130 MM HG: CPT | Performed by: INTERNAL MEDICINE

## 2023-04-19 RX ORDER — DULAGLUTIDE 3 MG/.5ML
3 INJECTION, SOLUTION SUBCUTANEOUS WEEKLY
Qty: 4 ADJUSTABLE DOSE PRE-FILLED PEN SYRINGE | Refills: 5 | Status: SHIPPED | OUTPATIENT
Start: 2023-04-19

## 2023-04-19 SDOH — ECONOMIC STABILITY: FOOD INSECURITY: WITHIN THE PAST 12 MONTHS, YOU WORRIED THAT YOUR FOOD WOULD RUN OUT BEFORE YOU GOT MONEY TO BUY MORE.: NEVER TRUE

## 2023-04-19 SDOH — ECONOMIC STABILITY: INCOME INSECURITY: HOW HARD IS IT FOR YOU TO PAY FOR THE VERY BASICS LIKE FOOD, HOUSING, MEDICAL CARE, AND HEATING?: NOT HARD AT ALL

## 2023-04-19 SDOH — ECONOMIC STABILITY: FOOD INSECURITY: WITHIN THE PAST 12 MONTHS, THE FOOD YOU BOUGHT JUST DIDN'T LAST AND YOU DIDN'T HAVE MONEY TO GET MORE.: NEVER TRUE

## 2023-04-19 SDOH — ECONOMIC STABILITY: HOUSING INSECURITY
IN THE LAST 12 MONTHS, WAS THERE A TIME WHEN YOU DID NOT HAVE A STEADY PLACE TO SLEEP OR SLEPT IN A SHELTER (INCLUDING NOW)?: NO

## 2023-04-19 ASSESSMENT — PATIENT HEALTH QUESTIONNAIRE - PHQ9
SUM OF ALL RESPONSES TO PHQ QUESTIONS 1-9: 0
1. LITTLE INTEREST OR PLEASURE IN DOING THINGS: 0
SUM OF ALL RESPONSES TO PHQ9 QUESTIONS 1 & 2: 0
2. FEELING DOWN, DEPRESSED OR HOPELESS: 0
SUM OF ALL RESPONSES TO PHQ QUESTIONS 1-9: 0

## 2023-04-19 NOTE — PROGRESS NOTES
Rehana Machado (:  1960) is a 58 y.o. female,Established patient, here for evaluation of the following chief complaint(s):  Diabetes  Need colonoscopy report from [facility]  2021       ASSESSMENT/PLAN:  1. Type 2 diabetes mellitus without complication, without long-term current use of insulin (HCC) stable on current regimen. Goal is to get A1c down to 6.5 or less. She has had no hypoglycemia. Will increase Trulicity to 3 mg weekly since tolerating well, continue metformin 1000 mg twice a day. And Jardiance 10 mg a day added for cardiovascular protection. Patient is to continue checking blood sugars regularly and if hypoglycemia occurs will discontinue glipizide 5 mg daily. Explained to patient the glipizide as well as blood sugar and has no vascular or renal protective aspects. Lab Results   Component Value Date    LABA1C 6.9 2023    LABA1C 6.8 2022    LABA1C 6.7 2022     Lab Results   Component Value Date    LABMICR <1.20 2022    LDLCALC 84 2022    CREATININE 0.5 (L) 2022       -     POCT glycosylated hemoglobin (Hb A1C)  -     Dulaglutide (TRULICITY) 3 EK/6.2WQ SOPN; Inject 3 mg into the skin once a week, Disp-4 Adjustable Dose Pre-filled Pen Syringe, R-5Normal  -     Comprehensive Metabolic Panel; Future  -     Prealbumin; Future  -     Fructosamine; Future  -     empagliflozin (JARDIANCE) 10 MG tablet; Take 1 tablet by mouth daily, Disp-30 tablet, R-5Normal  -     Urinalysis with Microscopic; Future  -     AFL - Wenceslao Godinez MD, (Vitreoretinal Diseases & Surgery) Ophthalmology, [facility]  2. Moderate persistent asthma without complication doing well with Breo but having some breakthrough so we will switch to WebChalet  -     Budeson-Glycopyrrol-Formoterol 160-9-4.8 MCG/ACT AERO; Inhale 2 puffs into the lungs in the morning and 2 puffs in the evening. Stop Breo., Disp-1 each, R-12Normal  3.  Colon cancer screening needed will get fit test.  -

## 2023-04-20 ASSESSMENT — ENCOUNTER SYMPTOMS
DIFFICULTY BREATHING: 0
HEMOPTYSIS: 0
COUGH: 1
HEARTBURN: 0
BLURRED VISION: 0
CHEST TIGHTNESS: 0
SHORTNESS OF BREATH: 0
SPUTUM PRODUCTION: 0
EYES NEGATIVE: 1
HOARSE VOICE: 0
TROUBLE SWALLOWING: 0
RHINORRHEA: 0
FREQUENT THROAT CLEARING: 0
WHEEZING: 1
SORE THROAT: 0

## 2023-04-22 NOTE — RESULT ENCOUNTER NOTE
The blood sugar control is good , but we are working to get the a1c below 6.5.  increasing the trulicity and adding the jardiance to protect your heart will help.

## 2023-05-03 RX ORDER — LISINOPRIL 40 MG/1
40 TABLET ORAL DAILY
Qty: 30 TABLET | Refills: 0 | Status: SHIPPED | OUTPATIENT
Start: 2023-05-03 | End: 2023-06-01

## 2023-05-03 RX ORDER — AMLODIPINE BESYLATE 5 MG/1
5 TABLET ORAL EVERY EVENING
Qty: 90 TABLET | Refills: 1 | Status: SHIPPED | OUTPATIENT
Start: 2023-05-03

## 2023-05-08 RX ORDER — LISINOPRIL 40 MG/1
TABLET ORAL
Qty: 90 TABLET | OUTPATIENT
Start: 2023-05-08

## 2023-05-31 NOTE — TELEPHONE ENCOUNTER
Future Appointments    Encounter Information    Provider Department Appt Notes   7/18/2023 Robert Zaragoza Primary Care 3 month follow-up     Past Visits    Date Provider Specialty Visit Type Primary Dx   04/19/2023 Isaiah Abraham MD Primary Care Office Visit Type 2 diabetes mellitus without complication, without long-term current use of insulin ([address])

## 2023-06-01 RX ORDER — LISINOPRIL 40 MG/1
40 TABLET ORAL DAILY
Qty: 15 TABLET | Refills: 0 | Status: SHIPPED | OUTPATIENT
Start: 2023-06-01 | End: 2023-06-20

## 2023-06-20 RX ORDER — LISINOPRIL 40 MG/1
TABLET ORAL
Qty: 15 TABLET | Refills: 0 | Status: SHIPPED | OUTPATIENT
Start: 2023-06-20

## 2023-06-20 NOTE — TELEPHONE ENCOUNTER
Future Appointments    Encounter Information    Provider Department Appt Notes   7/18/2023 Nellie Reed, [address] [facility] Primary Care 3 month follow-up     Past Visits    Date Provider Specialty Visit Type Primary Dx   04/19/2023 Nellie Reed MD Primary Care Office Visit Type 2 diabetes mellitus without complication, without long-term current use of insulin [facility]

## 2023-06-20 NOTE — TELEPHONE ENCOUNTER
Left voice mail message for patient will refill blood pressure mediction for two weeks, but must come in for blood tests to get a 3 month supply.

## 2023-06-29 DIAGNOSIS — E11.9 TYPE 2 DIABETES MELLITUS WITHOUT COMPLICATION, WITHOUT LONG-TERM CURRENT USE OF INSULIN (HCC): ICD-10-CM

## 2023-06-29 DIAGNOSIS — E03.9 ACQUIRED HYPOTHYROIDISM: ICD-10-CM

## 2023-06-29 DIAGNOSIS — E78.00 PURE HYPERCHOLESTEROLEMIA: ICD-10-CM

## 2023-06-29 LAB
ALBUMIN SERPL-MCNC: 4.8 G/DL (ref 3.4–5)
ALBUMIN/GLOB SERPL: 2.2 {RATIO} (ref 1.1–2.2)
ALP SERPL-CCNC: 80 U/L (ref 40–129)
ALT SERPL-CCNC: 23 U/L (ref 10–40)
ANION GAP SERPL CALCULATED.3IONS-SCNC: 17 MMOL/L (ref 3–16)
AST SERPL-CCNC: 23 U/L (ref 15–37)
BACTERIA URNS QL MICRO: ABNORMAL /HPF
BASOPHILS # BLD: 0 K/UL (ref 0–0.2)
BASOPHILS NFR BLD: 0.5 %
BILIRUB SERPL-MCNC: 0.3 MG/DL (ref 0–1)
BILIRUB UR QL STRIP.AUTO: NEGATIVE
BUN SERPL-MCNC: 20 MG/DL (ref 7–20)
CALCIUM SERPL-MCNC: 9.4 MG/DL (ref 8.3–10.6)
CHLORIDE SERPL-SCNC: 98 MMOL/L (ref 99–110)
CHOLEST SERPL-MCNC: 182 MG/DL (ref 0–199)
CLARITY UR: CLEAR
CO2 SERPL-SCNC: 20 MMOL/L (ref 21–32)
COLOR UR: YELLOW
CREAT SERPL-MCNC: 0.7 MG/DL (ref 0.6–1.2)
DEPRECATED RDW RBC AUTO: 13.9 % (ref 12.4–15.4)
EOSINOPHIL # BLD: 0.3 K/UL (ref 0–0.6)
EOSINOPHIL NFR BLD: 3 %
EPI CELLS #/AREA URNS AUTO: 2 /HPF (ref 0–5)
GFR SERPLBLD CREATININE-BSD FMLA CKD-EPI: >60 ML/MIN/{1.73_M2}
GLUCOSE SERPL-MCNC: 144 MG/DL (ref 70–99)
GLUCOSE UR STRIP.AUTO-MCNC: >=1000 MG/DL
HCT VFR BLD AUTO: 38.9 % (ref 36–48)
HDLC SERPL-MCNC: 61 MG/DL (ref 40–60)
HGB BLD-MCNC: 12.5 G/DL (ref 12–16)
HGB UR QL STRIP.AUTO: NEGATIVE
HYALINE CASTS #/AREA URNS AUTO: 11 /LPF (ref 0–8)
KETONES UR STRIP.AUTO-MCNC: ABNORMAL MG/DL
LDLC SERPL CALC-MCNC: 103 MG/DL
LEUKOCYTE ESTERASE UR QL STRIP.AUTO: NEGATIVE
LYMPHOCYTES # BLD: 3 K/UL (ref 1–5.1)
LYMPHOCYTES NFR BLD: 32.1 %
MCH RBC QN AUTO: 27.7 PG (ref 26–34)
MCHC RBC AUTO-ENTMCNC: 32.2 G/DL (ref 31–36)
MCV RBC AUTO: 86.2 FL (ref 80–100)
MONOCYTES # BLD: 0.7 K/UL (ref 0–1.3)
MONOCYTES NFR BLD: 7.4 %
MUCUS: PRESENT
NEUTROPHILS # BLD: 5.4 K/UL (ref 1.7–7.7)
NEUTROPHILS NFR BLD: 57 %
NITRITE UR QL STRIP.AUTO: NEGATIVE
PH UR STRIP.AUTO: 5 [PH] (ref 5–8)
PLATELET # BLD AUTO: 350 K/UL (ref 135–450)
PMV BLD AUTO: 7.8 FL (ref 5–10.5)
POTASSIUM SERPL-SCNC: 4.6 MMOL/L (ref 3.5–5.1)
PREALB SERPL-MCNC: 26.3 MG/DL (ref 20–40)
PROT SERPL-MCNC: 7 G/DL (ref 6.4–8.2)
PROT UR STRIP.AUTO-MCNC: ABNORMAL MG/DL
RBC # BLD AUTO: 4.51 M/UL (ref 4–5.2)
RBC CLUMPS #/AREA URNS AUTO: 3 /HPF (ref 0–4)
SODIUM SERPL-SCNC: 135 MMOL/L (ref 136–145)
SP GR UR STRIP.AUTO: 1.04 (ref 1–1.03)
TRIGL SERPL-MCNC: 91 MG/DL (ref 0–150)
TSH SERPL DL<=0.005 MIU/L-ACNC: 3.27 UIU/ML (ref 0.27–4.2)
UA DIPSTICK W REFLEX MICRO PNL UR: YES
URN SPEC COLLECT METH UR: ABNORMAL
UROBILINOGEN UR STRIP-ACNC: 0.2 E.U./DL
VLDLC SERPL CALC-MCNC: 18 MG/DL
WBC # BLD AUTO: 9.4 K/UL (ref 4–11)
WBC #/AREA URNS AUTO: 2 /HPF (ref 0–5)

## 2023-07-01 DIAGNOSIS — E78.00 PURE HYPERCHOLESTEROLEMIA: ICD-10-CM

## 2023-07-01 LAB — FRUCTOSAMINE SERPL-SCNC: 279 UMOL/L (ref 205–285)

## 2023-07-01 RX ORDER — PRAVASTATIN SODIUM 40 MG
40 TABLET ORAL DAILY
Qty: 90 TABLET | Refills: 3 | Status: SHIPPED | OUTPATIENT
Start: 2023-07-01

## 2023-07-01 NOTE — RESULT ENCOUNTER NOTE
Pulses is normal.  The cholesterol is almost at goal.  The LDL is 103 and we need to get it to 70. Take 2 of the 20 mg pravastatin's nightly. I sent in a new prescription for 40 mg pravastatin so when you have used up your 20 milligram tablets, you can take 140 mg tablet nightly. Getting the LDL cholesterol down to 70 or less reduces cardiovascular risk of heart attack and stroke associated with diabetes. I want you to be safe. The kidney function test is normal.  Normal liver blood test.  The Fructosamine level shows good diabetic control over the past 3 weeks. The thyroid test is normal.  The pre and albumin shows good protein nutrition. Normal CBC with no anemia. [employer] message sent.

## 2023-07-08 RX ORDER — LISINOPRIL 40 MG/1
40 TABLET ORAL DAILY
Qty: 90 TABLET | Refills: 0 | Status: SHIPPED | OUTPATIENT
Start: 2023-07-08 | End: 2023-07-19 | Stop reason: SDUPTHER

## 2023-07-13 NOTE — TELEPHONE ENCOUNTER
PT called in requesting a refill for her One Touch Ultra lancets and test strips. Please send to the [address] on [address].      LOV: 4/19/23  NOV: 7/18/23    Best call back number: [phone]

## 2023-07-14 RX ORDER — BLOOD SUGAR DIAGNOSTIC
1 STRIP MISCELLANEOUS 2 TIMES DAILY
Qty: 100 EACH | Refills: 1 | Status: SHIPPED | OUTPATIENT
Start: 2023-07-14

## 2023-07-14 RX ORDER — LANCETS 33 GAUGE
1 EACH MISCELLANEOUS 2 TIMES DAILY
Qty: 60 EACH | Refills: 3 | Status: SHIPPED | OUTPATIENT
Start: 2023-07-14

## 2023-07-19 ENCOUNTER — OFFICE VISIT (OUTPATIENT)
Dept: PRIMARY CARE CLINIC | Age: 63
End: 2023-07-19
Payer: COMMERCIAL

## 2023-07-19 VITALS
HEART RATE: 74 BPM | SYSTOLIC BLOOD PRESSURE: 110 MMHG | TEMPERATURE: 97.5 F | OXYGEN SATURATION: 96 % | DIASTOLIC BLOOD PRESSURE: 60 MMHG | BODY MASS INDEX: 30.36 KG/M2 | WEIGHT: 166 LBS

## 2023-07-19 DIAGNOSIS — E03.9 ACQUIRED HYPOTHYROIDISM: ICD-10-CM

## 2023-07-19 DIAGNOSIS — E78.00 PURE HYPERCHOLESTEROLEMIA: ICD-10-CM

## 2023-07-19 DIAGNOSIS — Z78.0 ASYMPTOMATIC LATE ONSET MENOPAUSE: Primary | ICD-10-CM

## 2023-07-19 DIAGNOSIS — Z12.83 SKIN CANCER SCREENING: ICD-10-CM

## 2023-07-19 DIAGNOSIS — M79.7 FIBROMYALGIA: ICD-10-CM

## 2023-07-19 DIAGNOSIS — K21.9 GASTROESOPHAGEAL REFLUX DISEASE WITHOUT ESOPHAGITIS: ICD-10-CM

## 2023-07-19 DIAGNOSIS — M25.551 RIGHT HIP PAIN: ICD-10-CM

## 2023-07-19 DIAGNOSIS — E11.9 TYPE 2 DIABETES MELLITUS WITHOUT COMPLICATION, WITHOUT LONG-TERM CURRENT USE OF INSULIN (HCC): ICD-10-CM

## 2023-07-19 DIAGNOSIS — Z12.11 COLON CANCER SCREENING: ICD-10-CM

## 2023-07-19 DIAGNOSIS — J30.89 OTHER ALLERGIC RHINITIS: ICD-10-CM

## 2023-07-19 DIAGNOSIS — E55.9 VITAMIN D DEFICIENCY: ICD-10-CM

## 2023-07-19 DIAGNOSIS — B37.2 SKIN CANDIDIASIS: ICD-10-CM

## 2023-07-19 DIAGNOSIS — J45.40 MODERATE PERSISTENT ASTHMA WITHOUT COMPLICATION: ICD-10-CM

## 2023-07-19 DIAGNOSIS — I10 ESSENTIAL HYPERTENSION: ICD-10-CM

## 2023-07-19 LAB
ALBUMIN SERPL-MCNC: 4.5 G/DL (ref 3.4–5)
ANION GAP SERPL CALCULATED.3IONS-SCNC: 12 MMOL/L (ref 3–16)
BUN SERPL-MCNC: 20 MG/DL (ref 7–20)
CALCIUM SERPL-MCNC: 9.6 MG/DL (ref 8.3–10.6)
CHLORIDE SERPL-SCNC: 103 MMOL/L (ref 99–110)
CO2 SERPL-SCNC: 28 MMOL/L (ref 21–32)
CREAT SERPL-MCNC: 0.6 MG/DL (ref 0.6–1.2)
GFR SERPLBLD CREATININE-BSD FMLA CKD-EPI: >60 ML/MIN/{1.73_M2}
GLUCOSE SERPL-MCNC: 112 MG/DL (ref 70–99)
PHOSPHATE SERPL-MCNC: 4.5 MG/DL (ref 2.5–4.9)
POTASSIUM SERPL-SCNC: 4.6 MMOL/L (ref 3.5–5.1)
SODIUM SERPL-SCNC: 143 MMOL/L (ref 136–145)

## 2023-07-19 PROCEDURE — 3017F COLORECTAL CA SCREEN DOC REV: CPT | Performed by: INTERNAL MEDICINE

## 2023-07-19 PROCEDURE — G8417 CALC BMI ABV UP PARAM F/U: HCPCS | Performed by: INTERNAL MEDICINE

## 2023-07-19 PROCEDURE — 99214 OFFICE O/P EST MOD 30 MIN: CPT | Performed by: INTERNAL MEDICINE

## 2023-07-19 PROCEDURE — 3044F HG A1C LEVEL LT 7.0%: CPT | Performed by: INTERNAL MEDICINE

## 2023-07-19 PROCEDURE — 2022F DILAT RTA XM EVC RTNOPTHY: CPT | Performed by: INTERNAL MEDICINE

## 2023-07-19 PROCEDURE — 1036F TOBACCO NON-USER: CPT | Performed by: INTERNAL MEDICINE

## 2023-07-19 PROCEDURE — 3074F SYST BP LT 130 MM HG: CPT | Performed by: INTERNAL MEDICINE

## 2023-07-19 PROCEDURE — 3078F DIAST BP <80 MM HG: CPT | Performed by: INTERNAL MEDICINE

## 2023-07-19 PROCEDURE — G8427 DOCREV CUR MEDS BY ELIG CLIN: HCPCS | Performed by: INTERNAL MEDICINE

## 2023-07-19 RX ORDER — CHOLECALCIFEROL (VITAMIN D3) 50 MCG
2000 TABLET ORAL DAILY
Qty: 90 TABLET | Refills: 3 | Status: SHIPPED | OUTPATIENT
Start: 2023-07-19

## 2023-07-19 RX ORDER — GABAPENTIN 300 MG/1
300 CAPSULE ORAL 2 TIMES DAILY
Qty: 180 CAPSULE | Refills: 3 | Status: SHIPPED | OUTPATIENT
Start: 2023-07-19 | End: 2024-07-13

## 2023-07-19 RX ORDER — DULOXETIN HYDROCHLORIDE 60 MG/1
60 CAPSULE, DELAYED RELEASE ORAL DAILY
Qty: 90 CAPSULE | Refills: 3 | Status: SHIPPED | OUTPATIENT
Start: 2023-07-19

## 2023-07-19 RX ORDER — AMLODIPINE BESYLATE 5 MG/1
5 TABLET ORAL EVERY EVENING
Qty: 90 TABLET | Refills: 3 | Status: SHIPPED | OUTPATIENT
Start: 2023-07-19

## 2023-07-19 RX ORDER — LISINOPRIL 40 MG/1
40 TABLET ORAL DAILY
Qty: 90 TABLET | Refills: 3 | Status: SHIPPED | OUTPATIENT
Start: 2023-07-19

## 2023-07-19 RX ORDER — DULAGLUTIDE 3 MG/.5ML
3 INJECTION, SOLUTION SUBCUTANEOUS WEEKLY
Qty: 12 ADJUSTABLE DOSE PRE-FILLED PEN SYRINGE | Refills: 3 | Status: SHIPPED | OUTPATIENT
Start: 2023-07-19

## 2023-07-19 RX ORDER — LEVOTHYROXINE SODIUM 0.07 MG/1
75 TABLET ORAL DAILY
Qty: 90 TABLET | Refills: 2 | Status: SHIPPED | OUTPATIENT
Start: 2023-07-19

## 2023-07-19 RX ORDER — KETOCONAZOLE 20 MG/G
CREAM TOPICAL
Qty: 60 G | Refills: 3 | Status: SHIPPED | OUTPATIENT
Start: 2023-07-19

## 2023-07-19 RX ORDER — MONTELUKAST SODIUM 10 MG/1
10 TABLET ORAL NIGHTLY
Qty: 90 TABLET | Refills: 3 | Status: SHIPPED | OUTPATIENT
Start: 2023-07-19

## 2023-07-19 RX ORDER — BLOOD SUGAR DIAGNOSTIC
1 STRIP MISCELLANEOUS 2 TIMES DAILY
Qty: 200 EACH | Refills: 5 | Status: SHIPPED | OUTPATIENT
Start: 2023-07-19

## 2023-07-19 RX ORDER — FLUTICASONE PROPIONATE 50 MCG
1 SPRAY, SUSPENSION (ML) NASAL DAILY
Qty: 48 G | Refills: 3 | Status: SHIPPED | OUTPATIENT
Start: 2023-07-19

## 2023-07-19 RX ORDER — PANTOPRAZOLE SODIUM 40 MG/1
40 TABLET, DELAYED RELEASE ORAL
Qty: 90 TABLET | Refills: 1 | Status: SHIPPED | OUTPATIENT
Start: 2023-07-19

## 2023-07-19 ASSESSMENT — ENCOUNTER SYMPTOMS
SORE THROAT: 0
COUGH: 0
SHORTNESS OF BREATH: 0
WHEEZING: 0
EYES NEGATIVE: 1
NAUSEA: 1
TROUBLE SWALLOWING: 0
RHINORRHEA: 0

## 2023-07-19 NOTE — PROGRESS NOTES
Psychiatric/Behavioral:  Negative for confusion. The patient is not nervous/anxious. Objective   Physical Exam  Eyes:      Extraocular Movements: Extraocular movements intact. Conjunctiva/sclera: Conjunctivae normal.      Pupils: Pupils are equal, round, and reactive to light. Cardiovascular:      Pulses: Normal pulses. Heart sounds: Normal heart sounds. Pulmonary:      Effort: Pulmonary effort is normal.      Breath sounds: Normal breath sounds. Abdominal:      General: Abdomen is flat. Bowel sounds are normal. There is no distension. Palpations: Abdomen is soft. There is no mass. Tenderness: There is no abdominal tenderness. Hernia: No hernia is present. Genitourinary:     Comments: No breast lumps or axillary adenopathy, getting diagnostic mammo for left breast pain . Musculoskeletal:      Cervical back: Normal range of motion and neck supple. Skin:     General: Skin is warm and dry. Comments: Candida under breast , needs skin cancer screen   Neurological:      General: No focal deficit present. Mental Status: She is alert and oriented to person, place, and time. Cranial Nerves: No cranial nerve deficit. Sensory: No sensory deficit. Gait: Gait normal.   Psychiatric:         Mood and Affect: Mood normal.         Behavior: Behavior normal.         Thought Content: Thought content normal.         Judgment: Judgment normal.              An electronic signature was used to authenticate this note.     --Audrey Clemente MD

## 2023-07-20 DIAGNOSIS — E78.00 PURE HYPERCHOLESTEROLEMIA: ICD-10-CM

## 2023-07-20 LAB
EST. AVERAGE GLUCOSE BLD GHB EST-MCNC: 157.1 MG/DL
HBA1C MFR BLD: 7.1 %

## 2023-07-20 RX ORDER — ROSUVASTATIN CALCIUM 20 MG/1
TABLET, COATED ORAL
Qty: 90 TABLET | OUTPATIENT
Start: 2023-07-20

## 2023-07-20 RX ORDER — ROSUVASTATIN CALCIUM 20 MG/1
20 TABLET, COATED ORAL NIGHTLY
Qty: 30 TABLET | Refills: 3 | Status: SHIPPED | OUTPATIENT
Start: 2023-07-20

## 2023-07-20 ASSESSMENT — ENCOUNTER SYMPTOMS
ORTHOPNEA: 0
BLURRED VISION: 0

## 2023-07-21 LAB — FRUCTOSAMINE SERPL-SCNC: 244 UMOL/L (ref 205–285)

## 2023-07-21 NOTE — RESULT ENCOUNTER NOTE
The kidney function test are normal and normal potassium level. The blood sugars are in the normal range for the past 3 weeks. The A1c is 7.1. Follow as you are not having nausea we can increase the Trulicity to 4.5 mg weekly.   Let me know what you think

## 2023-08-22 ENCOUNTER — HOSPITAL ENCOUNTER (OUTPATIENT)
Dept: WOMENS IMAGING | Age: 63
Discharge: HOME | End: 2023-08-22
Payer: COMMERCIAL

## 2023-08-22 ENCOUNTER — HOSPITAL ENCOUNTER (OUTPATIENT)
Dept: ULTRASOUND IMAGING | Age: 63
Discharge: HOME | End: 2023-08-22
Payer: COMMERCIAL

## 2023-08-22 VITALS — WEIGHT: 160 LBS | HEIGHT: 62 IN | BODY MASS INDEX: 29.44 KG/M2

## 2023-08-22 DIAGNOSIS — R92.8 ABNORMAL MAMMOGRAM OF RIGHT BREAST: ICD-10-CM

## 2023-08-22 DIAGNOSIS — N64.4 BREAST PAIN, LEFT: ICD-10-CM

## 2023-08-22 PROCEDURE — G0279 TOMOSYNTHESIS, MAMMO: HCPCS

## 2023-08-22 PROCEDURE — 76642 ULTRASOUND BREAST LIMITED: CPT

## 2023-09-26 ENCOUNTER — TELEPHONE (OUTPATIENT)
Dept: ADMINISTRATIVE | Age: 63
End: 2023-09-26

## 2023-09-26 NOTE — TELEPHONE ENCOUNTER
The RX on file for this METER/ STRIPS does not have a FAZAL. The pharmacies will keep running different  METERS/ STRIPS, until one is found that is on the formulary. Please contact patient to confirm  A READER was received. For CGMs it is a requirement that the patient can only qualify for CONTINUOUS READINGS if must test more than four times a day. If this requires a response please respond to the pool ( P MHCX [address]). Thank you please advise patient.

## 2023-09-27 DIAGNOSIS — E11.9 TYPE 2 DIABETES MELLITUS WITHOUT COMPLICATION, WITHOUT LONG-TERM CURRENT USE OF INSULIN (HCC): Primary | ICD-10-CM

## 2023-09-28 DIAGNOSIS — E11.9 TYPE 2 DIABETES MELLITUS WITHOUT COMPLICATION, WITHOUT LONG-TERM CURRENT USE OF INSULIN (HCC): Primary | ICD-10-CM

## 2023-09-28 RX ORDER — LANCETS 30 GAUGE
1 EACH MISCELLANEOUS 3 TIMES DAILY
Qty: 100 EACH | Refills: 8 | OUTPATIENT
Start: 2023-09-28

## 2023-09-28 RX ORDER — PERPHENAZINE 16 MG/1
1 TABLET, FILM COATED ORAL 2 TIMES DAILY
Qty: 100 EACH | Refills: 12 | Status: SHIPPED | OUTPATIENT
Start: 2023-09-28 | End: 2023-10-23 | Stop reason: SDUPTHER

## 2023-09-28 RX ORDER — GLUCOSAMINE HCL/CHONDROITIN SU 500-400 MG
1 CAPSULE ORAL 2 TIMES DAILY
Qty: 100 STRIP | Refills: 12 | Status: SHIPPED | OUTPATIENT
Start: 2023-09-28

## 2023-09-28 RX ORDER — BLOOD-GLUCOSE METER
1 EACH MISCELLANEOUS 3 TIMES DAILY PRN
Qty: 1 KIT | Refills: 0 | OUTPATIENT
Start: 2023-09-28

## 2023-09-28 RX ORDER — BLOOD-GLUCOSE METER
1 EACH MISCELLANEOUS
Qty: 1 KIT | Refills: 1 | Status: SHIPPED | OUTPATIENT
Start: 2023-09-28 | End: 2023-10-23 | Stop reason: SDUPTHER

## 2023-09-28 RX ORDER — PERPHENAZINE 16 MG/1
1 TABLET, FILM COATED ORAL 3 TIMES DAILY
Qty: 100 EACH | Refills: 8 | OUTPATIENT
Start: 2023-09-28

## 2023-10-06 DIAGNOSIS — I10 ESSENTIAL HYPERTENSION: ICD-10-CM

## 2023-10-06 RX ORDER — LISINOPRIL 40 MG/1
40 TABLET ORAL DAILY
Qty: 90 TABLET | Refills: 3 | Status: SHIPPED | OUTPATIENT
Start: 2023-10-06

## 2023-10-06 NOTE — TELEPHONE ENCOUNTER
Future Appointments    Encounter Information    Provider Department Appt Notes   10/23/2023 Zakiya Pena MD [facility] Primary Care Return in about 3 months     Past Visits    Date Provider Specialty Visit Type Primary Dx   07/19/2023 Zakiya Pena MD Primary Care Office Visit Asymptomatic late onset menopause   04/19/2023 Zakiya Pena MD Primary Care Office Visit Type 2 diabetes mellitus without complication, without long-term current use of insulin ([address])

## 2023-10-23 ENCOUNTER — OFFICE VISIT (OUTPATIENT)
Dept: PRIMARY CARE CLINIC | Age: 63
End: 2023-10-23
Payer: COMMERCIAL

## 2023-10-23 VITALS
TEMPERATURE: 97.2 F | BODY MASS INDEX: 29.63 KG/M2 | SYSTOLIC BLOOD PRESSURE: 108 MMHG | OXYGEN SATURATION: 97 % | HEART RATE: 78 BPM | DIASTOLIC BLOOD PRESSURE: 70 MMHG | WEIGHT: 161 LBS | HEIGHT: 62 IN

## 2023-10-23 DIAGNOSIS — B35.1 NAIL FUNGUS: ICD-10-CM

## 2023-10-23 DIAGNOSIS — I10 ESSENTIAL HYPERTENSION: ICD-10-CM

## 2023-10-23 DIAGNOSIS — E11.9 TYPE 2 DIABETES MELLITUS WITHOUT COMPLICATION, WITHOUT LONG-TERM CURRENT USE OF INSULIN (HCC): ICD-10-CM

## 2023-10-23 DIAGNOSIS — Z23 NEEDS FLU SHOT: ICD-10-CM

## 2023-10-23 DIAGNOSIS — K21.9 GASTROESOPHAGEAL REFLUX DISEASE WITHOUT ESOPHAGITIS: ICD-10-CM

## 2023-10-23 DIAGNOSIS — E55.9 VITAMIN D DEFICIENCY: Primary | ICD-10-CM

## 2023-10-23 DIAGNOSIS — E03.9 ACQUIRED HYPOTHYROIDISM: ICD-10-CM

## 2023-10-23 DIAGNOSIS — J30.89 OTHER ALLERGIC RHINITIS: ICD-10-CM

## 2023-10-23 DIAGNOSIS — B37.2 SKIN CANDIDIASIS: ICD-10-CM

## 2023-10-23 DIAGNOSIS — M79.7 FIBROMYALGIA: ICD-10-CM

## 2023-10-23 DIAGNOSIS — R10.84 GENERALIZED ABDOMINAL PAIN: ICD-10-CM

## 2023-10-23 DIAGNOSIS — J45.40 MODERATE PERSISTENT ASTHMA WITHOUT COMPLICATION: ICD-10-CM

## 2023-10-23 DIAGNOSIS — E78.00 PURE HYPERCHOLESTEROLEMIA: ICD-10-CM

## 2023-10-23 PROCEDURE — G8417 CALC BMI ABV UP PARAM F/U: HCPCS | Performed by: INTERNAL MEDICINE

## 2023-10-23 PROCEDURE — 3017F COLORECTAL CA SCREEN DOC REV: CPT | Performed by: INTERNAL MEDICINE

## 2023-10-23 PROCEDURE — 2022F DILAT RTA XM EVC RTNOPTHY: CPT | Performed by: INTERNAL MEDICINE

## 2023-10-23 PROCEDURE — G8427 DOCREV CUR MEDS BY ELIG CLIN: HCPCS | Performed by: INTERNAL MEDICINE

## 2023-10-23 PROCEDURE — 3078F DIAST BP <80 MM HG: CPT | Performed by: INTERNAL MEDICINE

## 2023-10-23 PROCEDURE — 3074F SYST BP LT 130 MM HG: CPT | Performed by: INTERNAL MEDICINE

## 2023-10-23 PROCEDURE — 99214 OFFICE O/P EST MOD 30 MIN: CPT | Performed by: INTERNAL MEDICINE

## 2023-10-23 PROCEDURE — 90471 IMMUNIZATION ADMIN: CPT | Performed by: INTERNAL MEDICINE

## 2023-10-23 PROCEDURE — G8484 FLU IMMUNIZE NO ADMIN: HCPCS | Performed by: INTERNAL MEDICINE

## 2023-10-23 PROCEDURE — 90694 VACC AIIV4 NO PRSRV 0.5ML IM: CPT | Performed by: INTERNAL MEDICINE

## 2023-10-23 PROCEDURE — 1036F TOBACCO NON-USER: CPT | Performed by: INTERNAL MEDICINE

## 2023-10-23 PROCEDURE — 3051F HG A1C>EQUAL 7.0%<8.0%: CPT | Performed by: INTERNAL MEDICINE

## 2023-10-23 RX ORDER — PANTOPRAZOLE SODIUM 40 MG/1
40 TABLET, DELAYED RELEASE ORAL
Qty: 90 TABLET | Refills: 1 | Status: SHIPPED | OUTPATIENT
Start: 2023-10-23

## 2023-10-23 RX ORDER — GLUCOSAMINE HCL/CHONDROITIN SU 500-400 MG
1 CAPSULE ORAL 2 TIMES DAILY
Qty: 100 STRIP | Refills: 12 | Status: SHIPPED | OUTPATIENT
Start: 2023-10-23

## 2023-10-23 RX ORDER — BLOOD-GLUCOSE METER
1 EACH MISCELLANEOUS
Qty: 1 KIT | Refills: 1 | Status: SHIPPED | OUTPATIENT
Start: 2023-10-23

## 2023-10-23 RX ORDER — GABAPENTIN 300 MG/1
300 CAPSULE ORAL 2 TIMES DAILY
Qty: 180 CAPSULE | Refills: 3 | Status: SHIPPED | OUTPATIENT
Start: 2023-10-23 | End: 2024-10-17

## 2023-10-23 RX ORDER — KETOCONAZOLE 20 MG/G
CREAM TOPICAL
Qty: 60 G | Refills: 3 | Status: SHIPPED | OUTPATIENT
Start: 2023-10-23

## 2023-10-23 RX ORDER — LEVOTHYROXINE SODIUM 0.07 MG/1
75 TABLET ORAL DAILY
Qty: 90 TABLET | Refills: 2 | Status: SHIPPED | OUTPATIENT
Start: 2023-10-23

## 2023-10-23 RX ORDER — MONTELUKAST SODIUM 10 MG/1
10 TABLET ORAL NIGHTLY
Qty: 30 TABLET | Refills: 12 | Status: SHIPPED | OUTPATIENT
Start: 2023-10-23

## 2023-10-23 RX ORDER — DULAGLUTIDE 3 MG/.5ML
3 INJECTION, SOLUTION SUBCUTANEOUS WEEKLY
Qty: 12 ADJUSTABLE DOSE PRE-FILLED PEN SYRINGE | Refills: 3 | Status: SHIPPED | OUTPATIENT
Start: 2023-10-23

## 2023-10-23 RX ORDER — ROSUVASTATIN CALCIUM 20 MG/1
20 TABLET, COATED ORAL NIGHTLY
Qty: 30 TABLET | Refills: 3 | Status: SHIPPED | OUTPATIENT
Start: 2023-10-23 | End: 2023-10-25

## 2023-10-23 RX ORDER — FLUTICASONE PROPIONATE 50 MCG
1 SPRAY, SUSPENSION (ML) NASAL DAILY
Qty: 48 G | Refills: 3 | Status: SHIPPED | OUTPATIENT
Start: 2023-10-23

## 2023-10-23 RX ORDER — LISINOPRIL 40 MG/1
40 TABLET ORAL DAILY
Qty: 30 TABLET | Refills: 12 | Status: SHIPPED | OUTPATIENT
Start: 2023-10-23

## 2023-10-23 RX ORDER — ERGOCALCIFEROL 1.25 MG/1
50000 CAPSULE ORAL WEEKLY
Qty: 4 CAPSULE | Refills: 12 | Status: SHIPPED | OUTPATIENT
Start: 2023-10-23

## 2023-10-23 RX ORDER — GLIPIZIDE 5 MG/1
5 TABLET, FILM COATED, EXTENDED RELEASE ORAL 2 TIMES DAILY
Qty: 180 TABLET | Refills: 3 | Status: SHIPPED | OUTPATIENT
Start: 2023-10-23

## 2023-10-23 RX ORDER — SUCRALFATE 1 G/1
1 TABLET ORAL
Qty: 270 TABLET | Refills: 1 | Status: SHIPPED | OUTPATIENT
Start: 2023-10-23

## 2023-10-23 RX ORDER — DULOXETIN HYDROCHLORIDE 60 MG/1
60 CAPSULE, DELAYED RELEASE ORAL DAILY
Qty: 90 CAPSULE | Refills: 3 | Status: SHIPPED | OUTPATIENT
Start: 2023-10-23

## 2023-10-23 RX ORDER — PERPHENAZINE 16 MG/1
1 TABLET, FILM COATED ORAL 2 TIMES DAILY
Qty: 100 EACH | Refills: 12 | Status: SHIPPED | OUTPATIENT
Start: 2023-10-23

## 2023-10-23 RX ORDER — AMLODIPINE BESYLATE 5 MG/1
5 TABLET ORAL EVERY EVENING
Qty: 30 TABLET | Refills: 12 | Status: SHIPPED | OUTPATIENT
Start: 2023-10-23

## 2023-10-23 RX ORDER — ALBUTEROL SULFATE 90 UG/1
2 AEROSOL, METERED RESPIRATORY (INHALATION) EVERY 6 HOURS PRN
Qty: 18 G | Refills: 3 | Status: SHIPPED | OUTPATIENT
Start: 2023-10-23

## 2023-10-23 ASSESSMENT — ENCOUNTER SYMPTOMS
BLURRED VISION: 0
COUGH: 0
SORE THROAT: 0
ORTHOPNEA: 0
TROUBLE SWALLOWING: 0
EYES NEGATIVE: 1
NAUSEA: 1
WHEEZING: 0
SHORTNESS OF BREATH: 0
RHINORRHEA: 0

## 2023-10-23 NOTE — PROGRESS NOTES
and diabetes mellitus. Current diabetic treatments: Metformin at 1000 g twice a day, glipizide 5 mg daily, Trulicity 1 metformin daily,Jardiance. She is compliant with treatment all of the time. She is following a diabetic diet. Meal planning includes avoidance of concentrated sweets. She has not had a previous visit with a dietitian. An ACE inhibitor/angiotensin II receptor blocker is being taken. Eye exam current: done at [facility] in August of 2023. Hypertension  This is a chronic problem. The current episode started more than 1 year ago. The problem is unchanged. The problem is controlled. Pertinent negatives include no blurred vision, chest pain, headaches, malaise/fatigue, neck pain, orthopnea, palpitations, peripheral edema, PND, shortness of breath or sweats. Risk factors for coronary artery disease include dyslipidemia, diabetes mellitus, obesity, post-menopausal state and sedentary lifestyle. Past treatments include calcium channel blockers, ACE inhibitors and lifestyle changes. The current treatment provides significant improvement. Compliance problems include exercise and diet. There is no history of CVA or PVD. Review of Systems   Constitutional:  Negative for appetite change, fatigue, fever, malaise/fatigue and weight loss. HENT:  Negative for ear pain, postnasal drip, rhinorrhea, sneezing, sore throat and trouble swallowing. Eyes: Negative. Negative for blurred vision. No eye symptoms but overdue for eye exam for diabetes and chronic Plaquenil therapy   Respiratory:  Negative for cough, shortness of breath and wheezing. Moderate persistent asthma with good control. Cardiovascular: Negative. Negative for chest pain, palpitations, orthopnea and PND. Essential hypertension   Gastrointestinal:  Positive for nausea. Passing gas and belching a lot that is new. Endocrine: Negative for polydipsia, polyphagia and polyuria.         Type 2 diabetes non-insulin-requiring

## 2023-10-25 DIAGNOSIS — E78.00 PURE HYPERCHOLESTEROLEMIA: ICD-10-CM

## 2023-10-25 RX ORDER — ROSUVASTATIN CALCIUM 40 MG/1
40 TABLET, COATED ORAL NIGHTLY
Qty: 90 TABLET | Refills: 3
Start: 2023-10-25

## 2023-11-05 DIAGNOSIS — I10 ESSENTIAL HYPERTENSION: ICD-10-CM

## 2023-11-06 ENCOUNTER — HOSPITAL ENCOUNTER (OUTPATIENT)
Dept: CT IMAGING | Age: 63
Discharge: HOME | End: 2023-11-06
Attending: INTERNAL MEDICINE
Payer: COMMERCIAL

## 2023-11-06 DIAGNOSIS — R10.84 GENERALIZED ABDOMINAL PAIN: ICD-10-CM

## 2023-11-06 PROCEDURE — 6360000004 HC RX CONTRAST MEDICATION: Performed by: INTERNAL MEDICINE

## 2023-11-06 PROCEDURE — 74177 CT ABD & PELVIS W/CONTRAST: CPT

## 2023-11-06 RX ADMIN — IOPAMIDOL 75 ML: 755 INJECTION, SOLUTION INTRAVENOUS at 07:39

## 2023-11-06 RX ADMIN — IOHEXOL 50 ML: 240 INJECTION, SOLUTION INTRATHECAL; INTRAVASCULAR; INTRAVENOUS; ORAL at 07:39

## 2023-11-07 RX ORDER — AMLODIPINE BESYLATE 5 MG/1
5 TABLET ORAL EVERY EVENING
Qty: 90 TABLET | Refills: 1 | Status: SHIPPED | OUTPATIENT
Start: 2023-11-07

## 2024-01-16 DIAGNOSIS — E11.9 TYPE 2 DIABETES MELLITUS WITHOUT COMPLICATION, WITHOUT LONG-TERM CURRENT USE OF INSULIN (HCC): ICD-10-CM

## 2024-01-16 RX ORDER — GLIPIZIDE 5 MG/1
5 TABLET, FILM COATED, EXTENDED RELEASE ORAL 2 TIMES DAILY
Qty: 180 TABLET | Refills: 3 | Status: CANCELLED | OUTPATIENT
Start: 2024-01-16

## 2024-01-16 NOTE — TELEPHONE ENCOUNTER
Spoke with pt she asking can she take   Pt is asking if [provider can up the mg to 10 mg   Twice a day   Pt has not had the Trulicty in 6 weeks   Please call pt to advise   glipiZIDE (GLUCOTROL XL) 5 MG extended release tablet     [id number] [address] - [place] [phone] - F [phone] [[id number]]    unknown

## 2024-01-16 NOTE — TELEPHONE ENCOUNTER
----- Message from Abdelrahman Montalvo sent at 1/16/2024  8:14 AM EST -----  Subject: Appointment Request    Reason for Call: Established Patient Appointment needed: Routine Existing   Condition Follow Up    QUESTIONS    Reason for appointment request? Available appointments did not meet   patient need     Additional Information for Provider? Pt states they are out of her   Trulicty medication and her blood sugar has run high in the 250s, pt would   like sooner appt. Please contact   ---------------------------------------------------------------------------  --------------  CALL BACK INFO  [phone]; OK to leave message on voicemail  ---------------------------------------------------------------------------  --------------  SCRIPT ANSWERS

## 2024-01-19 DIAGNOSIS — E11.9 TYPE 2 DIABETES MELLITUS WITHOUT COMPLICATION, WITHOUT LONG-TERM CURRENT USE OF INSULIN (HCC): Primary | ICD-10-CM

## 2024-01-19 RX ORDER — ORAL SEMAGLUTIDE 3 MG/1
3 TABLET ORAL DAILY
Qty: 30 TABLET | Refills: 5 | Status: SHIPPED | OUTPATIENT
Start: 2024-02-16 | End: 2024-08-16

## 2024-01-19 RX ORDER — ORAL SEMAGLUTIDE 7 MG/1
7 TABLET ORAL DAILY
Qty: 30 TABLET | Refills: 5 | Status: SHIPPED | OUTPATIENT
Start: 2024-02-16

## 2024-01-19 NOTE — TELEPHONE ENCOUNTER
Spoke with patient and okay to temporarily increase the glipizide.  Trulicity will not be available.  Verbal order given to pharmacist to switch to Rybelsus and it looks like it will be covered.  Await feedback from pharmacy and patient informed.

## 2024-01-23 ENCOUNTER — OFFICE VISIT (OUTPATIENT)
Dept: PRIMARY CARE CLINIC | Age: 64
End: 2024-01-23

## 2024-01-23 VITALS
HEART RATE: 97 BPM | SYSTOLIC BLOOD PRESSURE: 110 MMHG | DIASTOLIC BLOOD PRESSURE: 79 MMHG | BODY MASS INDEX: 30.26 KG/M2 | TEMPERATURE: 97.7 F | HEIGHT: 63 IN | WEIGHT: 170.8 LBS | OXYGEN SATURATION: 98 %

## 2024-01-23 DIAGNOSIS — M79.604 RIGHT LEG PAIN: ICD-10-CM

## 2024-01-23 DIAGNOSIS — E78.00 PURE HYPERCHOLESTEROLEMIA: ICD-10-CM

## 2024-01-23 DIAGNOSIS — I87.2 VENOUS INSUFFICIENCY OF BOTH LOWER EXTREMITIES: ICD-10-CM

## 2024-01-23 DIAGNOSIS — E11.9 TYPE 2 DIABETES MELLITUS WITHOUT COMPLICATION, WITHOUT LONG-TERM CURRENT USE OF INSULIN (HCC): Primary | ICD-10-CM

## 2024-01-23 DIAGNOSIS — F33.1 MAJOR DEPRESSIVE DISORDER, RECURRENT, MODERATE (HCC): ICD-10-CM

## 2024-01-23 DIAGNOSIS — I10 ESSENTIAL HYPERTENSION: ICD-10-CM

## 2024-01-23 DIAGNOSIS — F33.42 RECURRENT MAJOR DEPRESSIVE DISORDER, IN FULL REMISSION (HCC): ICD-10-CM

## 2024-01-23 DIAGNOSIS — F33.0 MAJOR DEPRESSIVE DISORDER, RECURRENT, MILD (HCC): ICD-10-CM

## 2024-01-23 PROBLEM — F33.9 MAJOR DEPRESSIVE DISORDER, RECURRENT, UNSPECIFIED (HCC): Status: ACTIVE | Noted: 2024-01-23

## 2024-01-23 LAB — HBA1C MFR BLD: 7.6 %

## 2024-01-23 RX ORDER — GLIPIZIDE 10 MG/1
10 TABLET, FILM COATED, EXTENDED RELEASE ORAL 2 TIMES DAILY
Qty: 180 TABLET | Refills: 1 | Status: SHIPPED | OUTPATIENT
Start: 2024-01-23

## 2024-01-23 ASSESSMENT — PATIENT HEALTH QUESTIONNAIRE - PHQ9
2. FEELING DOWN, DEPRESSED OR HOPELESS: 0
SUM OF ALL RESPONSES TO PHQ QUESTIONS 1-9: 0
1. LITTLE INTEREST OR PLEASURE IN DOING THINGS: 0
SUM OF ALL RESPONSES TO PHQ9 QUESTIONS 1 & 2: 0

## 2024-01-23 NOTE — PROGRESS NOTES
PND.        Essential hypertension   Gastrointestinal:  Negative for nausea.   Endocrine: Negative for polydipsia, polyphagia and polyuria.        Type 2 diabetes non-insulin-requiring and acquired hypothyroidism, hyperlipidemia   Genitourinary: Negative.    Musculoskeletal: Negative.  Negative for myalgias and neck pain.   Skin:  Negative for pallor.   Neurological:  Negative for dizziness, tremors, seizures, speech difficulty, weakness and headaches.   Hematological: Negative.    Psychiatric/Behavioral:  Negative for confusion. The patient is not nervous/anxious.           Objective   Physical Exam  Eyes:      Extraocular Movements: Extraocular movements intact.      Conjunctiva/sclera: Conjunctivae normal.      Pupils: Pupils are equal, round, and reactive to light.   Cardiovascular:      Pulses: Normal pulses.      Heart sounds: Normal heart sounds.   Pulmonary:      Effort: Pulmonary effort is normal.      Breath sounds: Normal breath sounds.   Abdominal:      General: Abdomen is flat. Bowel sounds are normal. There is no distension.      Palpations: Abdomen is soft. There is no mass.      Tenderness: There is no abdominal tenderness.      Hernia: No hernia is present.   Musculoskeletal:      Cervical back: Normal range of motion and neck supple.   Skin:     General: Skin is warm and dry.   Neurological:      General: No focal deficit present.      Mental Status: She is alert and oriented to person, place, and time.      Cranial Nerves: No cranial nerve deficit.      Sensory: No sensory deficit.      Gait: Gait normal.   Psychiatric:         Mood and Affect: Mood normal.         Behavior: Behavior normal.         Thought Content: Thought content normal.         Judgment: Judgment normal.                An electronic signature was used to authenticate this note.    --EDGARDO FERMIN MD

## 2024-01-23 NOTE — PATIENT INSTRUCTIONS
Get the RSV vaccine and the Prevnar 20 ( which is the new pneumonia vaccine )    Also get the shingrix

## 2024-01-25 NOTE — RESULT ENCOUNTER NOTE
The increase in the A1c was due to running out of medication that was not available at the pharmacy.  We are started on her new regimen and this should improve.

## 2024-01-28 PROBLEM — F33.0 MAJOR DEPRESSIVE DISORDER, RECURRENT, MILD (HCC): Status: RESOLVED | Noted: 2024-01-23 | Resolved: 2024-01-28

## 2024-01-28 PROBLEM — F33.1 MAJOR DEPRESSIVE DISORDER, RECURRENT, MODERATE (HCC): Status: RESOLVED | Noted: 2024-01-23 | Resolved: 2024-01-28

## 2024-01-28 PROBLEM — F33.9 MAJOR DEPRESSIVE DISORDER, RECURRENT, UNSPECIFIED (HCC): Status: RESOLVED | Noted: 2024-01-23 | Resolved: 2024-01-28

## 2024-01-31 ENCOUNTER — TELEPHONE (OUTPATIENT)
Dept: ADMINISTRATIVE | Age: 64
End: 2024-01-31

## 2024-01-31 NOTE — TELEPHONE ENCOUNTER
Submitted PA for Rybelsus 3MG tablets  Via CM (Key: [id number]) STATUS: PENDING.    Follow up done daily; if no decision with in three days we will refax.  If another three days goes by with no decision will call the insurance for status.

## 2024-02-02 DIAGNOSIS — J45.40 MODERATE PERSISTENT ASTHMA WITHOUT COMPLICATION: ICD-10-CM

## 2024-02-02 RX ORDER — ALBUTEROL SULFATE 90 UG/1
2 AEROSOL, METERED RESPIRATORY (INHALATION) EVERY 6 HOURS PRN
Qty: 6.7 G | Refills: 5 | Status: SHIPPED | OUTPATIENT
Start: 2024-02-02

## 2024-02-02 NOTE — TELEPHONE ENCOUNTER
Medication:   Requested Prescriptions     Pending Prescriptions Disp Refills    albuterol sulfate HFA (PROVENTIL;VENTOLIN;PROAIR) 108 (90 Base) MCG/ACT inhaler [Pharmacy Med Name: ALBUTEROL HFA INH (200 PUFFS) 6.7GM] 6.7 g      Sig: INHALE 2 PUFFS INTO THE LUNGS EVERY 6 HOURS AS NEEDED FOR WHEEZING        Last Filled:      Patient Phone Number: [phone] (home)     Last appt: 1/23/2024   Next appt: 4/24/2024    Last OARRS:        No data to display

## 2024-02-05 NOTE — TELEPHONE ENCOUNTER
The medication was DENIED; DENIAL letter uploaded to MEDIA.    Qty requested for Rybelsus 3mg is denied. After taking 3mg once daily for 30 days, the dose should be increased to 7mg once daily.     If you want an APPEAL; please note in this encounter what new information you would like to APPEAL with.  Once complete route back to PA POOL.    If this requires a response please respond to the pool ( P MHCX PSC MEDICATION PRE-AUTH).      Thank you please advise patient.

## 2024-02-09 DIAGNOSIS — B37.2 SKIN CANDIDIASIS: ICD-10-CM

## 2024-02-09 NOTE — TELEPHONE ENCOUNTER
Medication:   Requested Prescriptions     Pending Prescriptions Disp Refills    ketoconazole (NIZORAL) 2 % cream [Pharmacy Med Name: KETOCONAZOLE 2% CREAM 60GM] 60 g 3     Sig: APPLY TOPICALLY TO THE AFFECTED AREA DAILY        Last Filled:      Patient Phone Number: [phone] (home)     Last appt: 1/23/2024   Next appt: 4/24/2024    Last OARRS:        No data to display

## 2024-02-10 RX ORDER — KETOCONAZOLE 20 MG/G
CREAM TOPICAL
Qty: 60 G | Refills: 3 | Status: SHIPPED | OUTPATIENT
Start: 2024-02-10

## 2024-02-20 DIAGNOSIS — E11.9 TYPE 2 DIABETES MELLITUS WITHOUT COMPLICATION, WITHOUT LONG-TERM CURRENT USE OF INSULIN (HCC): ICD-10-CM

## 2024-02-20 RX ORDER — ORAL SEMAGLUTIDE 3 MG/1
3 TABLET ORAL DAILY
Qty: 30 TABLET | Refills: 0 | Status: SHIPPED | OUTPATIENT
Start: 2024-02-20 | End: 2024-02-23 | Stop reason: DRUGHIGH

## 2024-02-22 NOTE — TELEPHONE ENCOUNTER
Received a PA request for patients Rybelsus again thru . Patient has already picked up a script for 3mg on 01/22/24. Patient must go to the next dose of Rybelsus 7mg.     If this requires a response please respond to the pool ( P MHCX PSC MEDICATION PRE-AUTH).      Thank you please advise patient.

## 2024-02-23 DIAGNOSIS — E03.9 ACQUIRED HYPOTHYROIDISM: ICD-10-CM

## 2024-02-23 DIAGNOSIS — E11.8 TYPE 2 DIABETES MELLITUS WITH COMPLICATION, WITHOUT LONG-TERM CURRENT USE OF INSULIN (HCC): Primary | ICD-10-CM

## 2024-02-23 RX ORDER — ORAL SEMAGLUTIDE 7 MG/1
7 TABLET ORAL DAILY
Qty: 30 TABLET | Refills: 5 | Status: SHIPPED | OUTPATIENT
Start: 2024-02-23

## 2024-04-19 DIAGNOSIS — E03.9 ACQUIRED HYPOTHYROIDISM: ICD-10-CM

## 2024-04-19 RX ORDER — LEVOTHYROXINE SODIUM 0.07 MG/1
75 TABLET ORAL DAILY
Qty: 90 TABLET | Refills: 2 | Status: SHIPPED | OUTPATIENT
Start: 2024-04-19

## 2024-04-19 NOTE — TELEPHONE ENCOUNTER
Medication:   Requested Prescriptions     Pending Prescriptions Disp Refills    levothyroxine (SYNTHROID) 75 MCG tablet [Pharmacy Med Name: LEVOTHYROXINE 0.075MG (75MCG) TABS] 90 tablet 2     Sig: TAKE 1 TABLET BY MOUTH DAILY        Last Filled:      Patient Phone Number: [phone] (home)     Last appt: 1/23/2024   Next appt: 4/24/2024    Last OARRS:        No data to display

## 2024-05-10 DIAGNOSIS — I10 ESSENTIAL HYPERTENSION: ICD-10-CM

## 2024-05-10 RX ORDER — AMLODIPINE BESYLATE 5 MG/1
5 TABLET ORAL EVERY EVENING
Qty: 90 TABLET | Refills: 1 | Status: SHIPPED | OUTPATIENT
Start: 2024-05-10

## 2024-05-10 NOTE — TELEPHONE ENCOUNTER
Medication:   Requested Prescriptions     Pending Prescriptions Disp Refills    amLODIPine (NORVASC) 5 MG tablet [Pharmacy Med Name: AMLODIPINE BESYLATE 5MG TABLETS] 90 tablet 1     Sig: TAKE 1 TABLET BY MOUTH EVERY EVENING        Last Filled:      Patient Phone Number: [phone] (home)     Last appt: 1/23/2024   Next appt: 5/29/2024    Last OARRS:        No data to display

## 2024-05-29 ENCOUNTER — OFFICE VISIT (OUTPATIENT)
Dept: PRIMARY CARE CLINIC | Age: 64
End: 2024-05-29

## 2024-05-29 VITALS
HEART RATE: 94 BPM | WEIGHT: 167.4 LBS | BODY MASS INDEX: 30.8 KG/M2 | DIASTOLIC BLOOD PRESSURE: 64 MMHG | HEIGHT: 62 IN | SYSTOLIC BLOOD PRESSURE: 122 MMHG | TEMPERATURE: 97.1 F

## 2024-05-29 DIAGNOSIS — J30.89 OTHER ALLERGIC RHINITIS: ICD-10-CM

## 2024-05-29 DIAGNOSIS — Z00.00 INITIAL MEDICARE ANNUAL WELLNESS VISIT: ICD-10-CM

## 2024-05-29 DIAGNOSIS — E55.9 VITAMIN D DEFICIENCY: ICD-10-CM

## 2024-05-29 DIAGNOSIS — M25.561 CHRONIC PAIN OF RIGHT KNEE: ICD-10-CM

## 2024-05-29 DIAGNOSIS — M05.79 RHEUMATOID ARTHRITIS INVOLVING MULTIPLE SITES WITH POSITIVE RHEUMATOID FACTOR (HCC): ICD-10-CM

## 2024-05-29 DIAGNOSIS — E11.8 TYPE 2 DIABETES MELLITUS WITH COMPLICATION, WITHOUT LONG-TERM CURRENT USE OF INSULIN (HCC): ICD-10-CM

## 2024-05-29 DIAGNOSIS — E03.9 ACQUIRED HYPOTHYROIDISM: ICD-10-CM

## 2024-05-29 DIAGNOSIS — I10 ESSENTIAL HYPERTENSION: ICD-10-CM

## 2024-05-29 DIAGNOSIS — M25.551 RIGHT HIP PAIN: ICD-10-CM

## 2024-05-29 DIAGNOSIS — R20.0 NUMBNESS OF FINGERS OF BOTH HANDS: Primary | ICD-10-CM

## 2024-05-29 DIAGNOSIS — J45.40 MODERATE PERSISTENT ASTHMA WITHOUT COMPLICATION: ICD-10-CM

## 2024-05-29 DIAGNOSIS — G89.29 CHRONIC PAIN OF RIGHT KNEE: ICD-10-CM

## 2024-05-29 DIAGNOSIS — E78.00 PURE HYPERCHOLESTEROLEMIA: ICD-10-CM

## 2024-05-29 DIAGNOSIS — M79.7 FIBROMYALGIA: ICD-10-CM

## 2024-05-29 DIAGNOSIS — R06.02 SHORTNESS OF BREATH: ICD-10-CM

## 2024-05-29 DIAGNOSIS — K21.9 GASTROESOPHAGEAL REFLUX DISEASE WITHOUT ESOPHAGITIS: ICD-10-CM

## 2024-05-29 RX ORDER — GABAPENTIN 300 MG/1
300 CAPSULE ORAL 2 TIMES DAILY
Qty: 180 CAPSULE | Refills: 3 | Status: SHIPPED | OUTPATIENT
Start: 2024-05-29 | End: 2025-05-24

## 2024-05-29 RX ORDER — ROSUVASTATIN CALCIUM 40 MG/1
40 TABLET, COATED ORAL NIGHTLY
Qty: 90 TABLET | Refills: 3 | Status: SHIPPED | OUTPATIENT
Start: 2024-05-29

## 2024-05-29 RX ORDER — ERGOCALCIFEROL 1.25 MG/1
50000 CAPSULE ORAL WEEKLY
Qty: 4 CAPSULE | Refills: 12 | Status: SHIPPED | OUTPATIENT
Start: 2024-05-29

## 2024-05-29 RX ORDER — AMLODIPINE BESYLATE 5 MG/1
5 TABLET ORAL EVERY EVENING
Qty: 90 TABLET | Refills: 3 | Status: SHIPPED | OUTPATIENT
Start: 2024-05-29

## 2024-05-29 RX ORDER — SUCRALFATE 1 G/1
1 TABLET ORAL
Qty: 270 TABLET | Refills: 1 | Status: SHIPPED | OUTPATIENT
Start: 2024-05-29

## 2024-05-29 RX ORDER — ALBUTEROL SULFATE 90 UG/1
2 AEROSOL, METERED RESPIRATORY (INHALATION) EVERY 6 HOURS PRN
Qty: 6.7 G | Refills: 5 | Status: SHIPPED | OUTPATIENT
Start: 2024-05-29

## 2024-05-29 RX ORDER — MONTELUKAST SODIUM 10 MG/1
10 TABLET ORAL NIGHTLY
Qty: 30 TABLET | Refills: 12 | Status: SHIPPED | OUTPATIENT
Start: 2024-05-29

## 2024-05-29 RX ORDER — ORAL SEMAGLUTIDE 14 MG/1
14 TABLET ORAL
Qty: 30 TABLET | Refills: 12 | Status: SHIPPED | OUTPATIENT
Start: 2024-05-29

## 2024-05-29 RX ORDER — LEVOTHYROXINE SODIUM 0.07 MG/1
75 TABLET ORAL DAILY
Qty: 90 TABLET | Refills: 2 | Status: SHIPPED | OUTPATIENT
Start: 2024-05-29

## 2024-05-29 RX ORDER — PANTOPRAZOLE SODIUM 40 MG/1
40 TABLET, DELAYED RELEASE ORAL
Qty: 90 TABLET | Refills: 1 | Status: SHIPPED | OUTPATIENT
Start: 2024-05-29

## 2024-05-29 RX ORDER — DULOXETIN HYDROCHLORIDE 60 MG/1
60 CAPSULE, DELAYED RELEASE ORAL DAILY
Qty: 90 CAPSULE | Refills: 3 | Status: SHIPPED | OUTPATIENT
Start: 2024-05-29

## 2024-05-29 RX ORDER — GLIPIZIDE 10 MG/1
10 TABLET, FILM COATED, EXTENDED RELEASE ORAL 2 TIMES DAILY
Qty: 180 TABLET | Refills: 1 | Status: SHIPPED | OUTPATIENT
Start: 2024-05-29

## 2024-05-29 RX ORDER — ORAL SEMAGLUTIDE 7 MG/1
7 TABLET ORAL DAILY
Qty: 30 TABLET | Refills: 5 | Status: CANCELLED | OUTPATIENT
Start: 2024-05-29

## 2024-05-29 RX ORDER — LISINOPRIL 40 MG/1
40 TABLET ORAL DAILY
Qty: 30 TABLET | Refills: 12 | Status: SHIPPED | OUTPATIENT
Start: 2024-05-29

## 2024-05-29 RX ORDER — FLUTICASONE PROPIONATE 50 MCG
1 SPRAY, SUSPENSION (ML) NASAL DAILY
Qty: 48 G | Refills: 3 | Status: SHIPPED | OUTPATIENT
Start: 2024-05-29

## 2024-05-29 SDOH — ECONOMIC STABILITY: FOOD INSECURITY: WITHIN THE PAST 12 MONTHS, THE FOOD YOU BOUGHT JUST DIDN'T LAST AND YOU DIDN'T HAVE MONEY TO GET MORE.: NEVER TRUE

## 2024-05-29 SDOH — ECONOMIC STABILITY: FOOD INSECURITY: WITHIN THE PAST 12 MONTHS, YOU WORRIED THAT YOUR FOOD WOULD RUN OUT BEFORE YOU GOT MONEY TO BUY MORE.: NEVER TRUE

## 2024-05-29 SDOH — ECONOMIC STABILITY: INCOME INSECURITY: HOW HARD IS IT FOR YOU TO PAY FOR THE VERY BASICS LIKE FOOD, HOUSING, MEDICAL CARE, AND HEATING?: NOT VERY HARD

## 2024-05-29 ASSESSMENT — PATIENT HEALTH QUESTIONNAIRE - PHQ9
2. FEELING DOWN, DEPRESSED OR HOPELESS: NOT AT ALL
SUM OF ALL RESPONSES TO PHQ QUESTIONS 1-9: 7
SUM OF ALL RESPONSES TO PHQ QUESTIONS 1-9: 7
3. TROUBLE FALLING OR STAYING ASLEEP: NEARLY EVERY DAY
1. LITTLE INTEREST OR PLEASURE IN DOING THINGS: NOT AT ALL
SUM OF ALL RESPONSES TO PHQ QUESTIONS 1-9: 7
SUM OF ALL RESPONSES TO PHQ QUESTIONS 1-9: 7
10. IF YOU CHECKED OFF ANY PROBLEMS, HOW DIFFICULT HAVE THESE PROBLEMS MADE IT FOR YOU TO DO YOUR WORK, TAKE CARE OF THINGS AT HOME, OR GET ALONG WITH OTHER PEOPLE: NOT DIFFICULT AT ALL
5. POOR APPETITE OR OVEREATING: NOT AT ALL
SUM OF ALL RESPONSES TO PHQ9 QUESTIONS 1 & 2: 0
7. TROUBLE CONCENTRATING ON THINGS, SUCH AS READING THE NEWSPAPER OR WATCHING TELEVISION: NEARLY EVERY DAY
4. FEELING TIRED OR HAVING LITTLE ENERGY: SEVERAL DAYS
8. MOVING OR SPEAKING SO SLOWLY THAT OTHER PEOPLE COULD HAVE NOTICED. OR THE OPPOSITE, BEING SO FIGETY OR RESTLESS THAT YOU HAVE BEEN MOVING AROUND A LOT MORE THAN USUAL: NOT AT ALL
9. THOUGHTS THAT YOU WOULD BE BETTER OFF DEAD, OR OF HURTING YOURSELF: NOT AT ALL
6. FEELING BAD ABOUT YOURSELF - OR THAT YOU ARE A FAILURE OR HAVE LET YOURSELF OR YOUR FAMILY DOWN: NOT AT ALL

## 2024-05-29 ASSESSMENT — LIFESTYLE VARIABLES
HOW MANY STANDARD DRINKS CONTAINING ALCOHOL DO YOU HAVE ON A TYPICAL DAY: PATIENT DOES NOT DRINK
HOW OFTEN DO YOU HAVE A DRINK CONTAINING ALCOHOL: NEVER

## 2024-05-29 NOTE — PATIENT INSTRUCTIONS
[facility] Financial Resources*  (Call [employer]/211 if need more resources.)      [employer]   Speak to a trained professional 24/7 who can connect you to essential community services including food, clothing, transportation, housing, utilities, employment services, childcare, and baby supplies. 211 serves nationwide.   [facility]TrendU for resources in [place], [facility], [place] and [facility] in [place]; [place], [place], [place], and [facility] in [place].   [email or web address]/resources for resources in [place], [place], [place], [place], [place], [place], [place], [place], [facility], [place], [place], and [facility] in [place].     [employer] Financial Assistance  What they offer: Financial assistance programs that are designed to assist you in finding resources that may help pay your hospital bill. Please click on the links below to learn more about the financial assistance programs available within our regions.  Phone Number: [phone]  How to apply for the [facility] Financial Assistance Program:       Option 1: To apply for financial assistance, a patient (or their family or other provider) should fill out the Financial Assistance Application. Copies of the Financial Assistance Application and the FAP may be obtained for free by calling the [facility] Customer Service department at [phone]   Option 2: The Financial Assistance Application and policy may be obtained for free by downloading a copy from the [employer] website:  [email or web address]-assistance  [place] Health Care Assurance Program  What they offer:  Patients who need hospital care, but are unable to pay for it, may be eligible for free or reduced fee care at [facility] through the Hospital Care Assurance Program (HCAP). Applications for HCAP are accepted by the hospital where care was received, and patients seeking HCAP assistance should contact their hospital’s billing department for

## 2024-05-29 NOTE — PROGRESS NOTES
normal.      Breath sounds: Normal breath sounds.   Abdominal:      General: Abdomen is flat. Bowel sounds are normal. There is no distension.      Palpations: Abdomen is soft. There is no mass.      Tenderness: There is no abdominal tenderness.      Hernia: No hernia is present.   Musculoskeletal:      Cervical back: Normal range of motion and neck supple.   Skin:     General: Skin is warm and dry.   Neurological:      General: No focal deficit present.      Mental Status: She is alert and oriented to person, place, and time.      Cranial Nerves: No cranial nerve deficit.      Sensory: No sensory deficit.      Gait: Gait normal.      Comments: Normal pulses in the feet but decreased vibratory sensation but normal neurofilament   Psychiatric:         Mood and Affect: Mood normal.         Behavior: Behavior normal.         Thought Content: Thought content normal.         Judgment: Judgment normal.              Allergies   Allergen Reactions    Latex      Gloves cause skin irritation and asthma     Prior to Visit Medications    Medication Sig Taking? Authorizing Provider   albuterol sulfate HFA (PROVENTIL;VENTOLIN;PROAIR) 108 (90 Base) MCG/ACT inhaler Inhale 2 puffs into the lungs every 6 hours as needed for Wheezing Yes Svetlana Barba MD   amLODIPine (NORVASC) 5 MG tablet Take 1 tablet by mouth every evening Yes Svetlana Barba MD   DULoxetine (CYMBALTA) 60 MG extended release capsule Take 1 capsule by mouth daily Yes Svetlana Barba MD   empagliflozin (JARDIANCE) 10 MG tablet Take 1 tablet by mouth daily Yes Svetlana Barba MD   fluticasone (FLONASE) 50 MCG/ACT nasal spray 1 spray by Nasal route daily Yes Svetlana Barba MD   gabapentin (NEURONTIN) 300 MG capsule Take 1 capsule by mouth 2 times daily for 360 days. Yes Svetlana Barba MD   glipiZIDE (GLUCOTROL XL) 10 MG extended release tablet Take 1 tablet by mouth in the morning and 1 tablet in the evening. Yes

## 2024-06-07 ENCOUNTER — HOSPITAL ENCOUNTER (OUTPATIENT)
Age: 64
Discharge: HOME | End: 2024-06-07

## 2024-06-28 PROBLEM — Z00.00 INITIAL MEDICARE ANNUAL WELLNESS VISIT: Status: RESOLVED | Noted: 2024-05-29 | Resolved: 2024-06-28

## 2024-07-18 DIAGNOSIS — J30.89 OTHER ALLERGIC RHINITIS: ICD-10-CM

## 2024-07-18 RX ORDER — FLUTICASONE PROPIONATE 50 MCG
1 SPRAY, SUSPENSION (ML) NASAL DAILY
Qty: 48 G | Refills: 5 | Status: SHIPPED | OUTPATIENT
Start: 2024-07-18

## 2024-07-18 NOTE — TELEPHONE ENCOUNTER
Medication:   Requested Prescriptions     Pending Prescriptions Disp Refills    fluticasone (FLONASE) 50 MCG/ACT nasal spray [Pharmacy Med Name: FLUTICASONE 50MCG NASAL SP (120) RX] 48 g 3     Sig: USE 1 SPRAY NASALLY DAILY        Last Filled:      Patient Phone Number: [phone] (home)     Last appt: 5/29/2024   Next appt: 8/28/2024    Last OARRS:        No data to display

## 2024-07-20 DIAGNOSIS — K21.9 GASTROESOPHAGEAL REFLUX DISEASE WITHOUT ESOPHAGITIS: ICD-10-CM

## 2024-07-22 RX ORDER — PANTOPRAZOLE SODIUM 40 MG/1
40 TABLET, DELAYED RELEASE ORAL
Qty: 90 TABLET | Refills: 1 | Status: SHIPPED | OUTPATIENT
Start: 2024-07-22

## 2024-07-22 NOTE — TELEPHONE ENCOUNTER
Medication:   Requested Prescriptions     Pending Prescriptions Disp Refills    pantoprazole (PROTONIX) 40 MG tablet [Pharmacy Med Name: PANTOPRAZOLE 40MG TABLETS] 90 tablet 1     Sig: TAKE 1 TABLET BY MOUTH EVERY MORNING BEFORE BREAKFAST        Last Filled:      Patient Phone Number: [phone] (home)     Last appt: 5/29/2024   Next appt: 8/28/2024    Last OARRS:        No data to display

## 2024-08-12 DIAGNOSIS — E11.8 TYPE 2 DIABETES MELLITUS WITH COMPLICATION, WITHOUT LONG-TERM CURRENT USE OF INSULIN (HCC): ICD-10-CM

## 2024-08-12 RX ORDER — ORAL SEMAGLUTIDE 14 MG/1
14 TABLET ORAL
Qty: 30 TABLET | Refills: 12 | Status: CANCELLED | OUTPATIENT
Start: 2024-08-12

## 2024-08-12 NOTE — TELEPHONE ENCOUNTER
Medication:   Requested Prescriptions     Pending Prescriptions Disp Refills    Semaglutide (RYBELSUS) 14 MG TABS 30 tablet 12     Sig: Take 14 mg by mouth every morning (before breakfast)        Last Filled:      Patient Phone Number: [phone] (home)     Last appt: 5/29/2024   Next appt: 8/28/2024    Last OARRS:        No data to display

## 2024-08-12 NOTE — TELEPHONE ENCOUNTER
PT stop in for refill on 90 day supply   Pt is out of medication    Semaglutide (RYBELSUS) 14 MG TABS     [id number] [address] [phone] - F [phone] [[id number]]

## 2024-08-14 DIAGNOSIS — E11.8 TYPE 2 DIABETES MELLITUS WITH COMPLICATION, WITHOUT LONG-TERM CURRENT USE OF INSULIN (HCC): ICD-10-CM

## 2024-08-15 ENCOUNTER — TELEPHONE (OUTPATIENT)
Dept: PRIMARY CARE CLINIC | Age: 64
End: 2024-08-15

## 2024-08-15 DIAGNOSIS — E11.8 TYPE 2 DIABETES MELLITUS WITH COMPLICATION, WITHOUT LONG-TERM CURRENT USE OF INSULIN (HCC): ICD-10-CM

## 2024-08-15 RX ORDER — LANCETS 33 GAUGE
EACH MISCELLANEOUS
Qty: 200 EACH | Refills: 5 | Status: SHIPPED | OUTPATIENT
Start: 2024-08-15

## 2024-08-15 RX ORDER — ORAL SEMAGLUTIDE 14 MG/1
14 TABLET ORAL
Qty: 30 TABLET | Refills: 12 | Status: SHIPPED | OUTPATIENT
Start: 2024-08-15 | End: 2024-08-15 | Stop reason: SINTOL

## 2024-08-15 RX ORDER — ORAL SEMAGLUTIDE 7 MG/1
7 TABLET ORAL DAILY
Qty: 90 TABLET | Refills: 1 | OUTPATIENT
Start: 2024-08-15

## 2024-08-15 NOTE — TELEPHONE ENCOUNTER
She can take the 7 mg Rybelsus. The 14 mg caused side effects. She said you discussed at her visit that she could go back on the 7 mg. Please send to pharmacy

## 2024-08-16 DIAGNOSIS — E11.8 TYPE 2 DIABETES MELLITUS WITH COMPLICATION, WITHOUT LONG-TERM CURRENT USE OF INSULIN (HCC): Primary | ICD-10-CM

## 2024-08-16 RX ORDER — ORAL SEMAGLUTIDE 7 MG/1
7 TABLET ORAL DAILY
Qty: 30 TABLET | Refills: 12 | Status: SHIPPED | OUTPATIENT
Start: 2024-08-16

## 2024-08-30 DIAGNOSIS — J45.40 MODERATE PERSISTENT ASTHMA WITHOUT COMPLICATION: ICD-10-CM

## 2024-08-30 RX ORDER — ALBUTEROL SULFATE 90 UG/1
2 AEROSOL, METERED RESPIRATORY (INHALATION) EVERY 6 HOURS PRN
Qty: 6.7 G | Refills: 5 | Status: SHIPPED | OUTPATIENT
Start: 2024-08-30

## 2024-08-30 NOTE — TELEPHONE ENCOUNTER
Medication:   Requested Prescriptions     Pending Prescriptions Disp Refills    albuterol sulfate HFA (PROVENTIL;VENTOLIN;PROAIR) 108 (90 Base) MCG/ACT inhaler [Pharmacy Med Name: ALBUTEROL HFA INH (200 PUFFS) 6.7GM] 6.7 g 5     Sig: INHALE 2 PUFFS INTO THE LUNGS EVERY 6 HOURS AS NEEDED FOR WHEEZING        Last Filled:      Patient Phone Number: [phone] (home)     Last appt: 5/29/2024   Next appt: Visit date not found    Last OARRS:        No data to display

## 2024-10-16 DIAGNOSIS — E11.9 TYPE 2 DIABETES MELLITUS WITHOUT COMPLICATION, WITHOUT LONG-TERM CURRENT USE OF INSULIN (HCC): ICD-10-CM

## 2024-10-16 NOTE — TELEPHONE ENCOUNTER
Medication:   Requested Prescriptions     Pending Prescriptions Disp Refills    CONTOUR NEXT TEST strip [Pharmacy Med Name: CONTOUR NEXT TEST STRIPS 100S] 100 strip      Sig: TEST TWICE DAILY AS NEEDED       Last Filled:      Patient Phone Number: [phone] (home)     Last appt: 5/29/2024   Next appt: Visit date not found    Last Labs DM:   Lab Results   Component Value Date/Time    LABA1C 7.6 01/23/2024 10:03 AM

## 2024-10-27 DIAGNOSIS — E03.9 ACQUIRED HYPOTHYROIDISM: ICD-10-CM

## 2024-10-27 DIAGNOSIS — E11.9 TYPE 2 DIABETES MELLITUS WITHOUT COMPLICATION, WITHOUT LONG-TERM CURRENT USE OF INSULIN (HCC): ICD-10-CM

## 2024-10-28 NOTE — TELEPHONE ENCOUNTER
Medication:   Requested Prescriptions     Pending Prescriptions Disp Refills    levothyroxine (SYNTHROID) 75 MCG tablet [Pharmacy Med Name: LEVOTHYROXINE 0.075MG (75MCG) TABS] 90 tablet 2     Sig: TAKE 1 TABLET BY MOUTH DAILY    glipiZIDE (GLUCOTROL XL) 5 MG extended release tablet [Pharmacy Med Name: GLIPIZIDE ER 5MG TABLETS] 180 tablet 3     Sig: TAKE 1 TABLET BY MOUTH IN THE MORNING AND IN THE EVENING       Last Filled:      Patient Phone Number: [phone] (home)     Last appt: 5/29/2024   Next appt: Visit date not found    Last Labs DM:   Lab Results   Component Value Date/Time    LABA1C 7.6 01/23/2024 10:03 AM       Last Thyroid:   Lab Results   Component Value Date/Time    TSH 1.55 02/25/2022 10:13 AM

## 2024-10-29 RX ORDER — GLIPIZIDE 5 MG/1
TABLET, FILM COATED, EXTENDED RELEASE ORAL
Qty: 180 TABLET | Refills: 3 | Status: SHIPPED | OUTPATIENT
Start: 2024-10-29

## 2024-10-29 RX ORDER — LEVOTHYROXINE SODIUM 75 UG/1
75 TABLET ORAL DAILY
Qty: 90 TABLET | Refills: 2 | Status: SHIPPED | OUTPATIENT
Start: 2024-10-29

## 2024-11-11 DIAGNOSIS — I10 ESSENTIAL HYPERTENSION: ICD-10-CM

## 2024-11-11 DIAGNOSIS — E11.8 TYPE 2 DIABETES MELLITUS WITH COMPLICATION, WITHOUT LONG-TERM CURRENT USE OF INSULIN (HCC): ICD-10-CM

## 2024-11-11 DIAGNOSIS — E11.9 TYPE 2 DIABETES MELLITUS WITHOUT COMPLICATION, WITHOUT LONG-TERM CURRENT USE OF INSULIN (HCC): ICD-10-CM

## 2024-11-11 DIAGNOSIS — J45.40 MODERATE PERSISTENT ASTHMA WITHOUT COMPLICATION: ICD-10-CM

## 2024-11-11 RX ORDER — MONTELUKAST SODIUM 10 MG/1
10 TABLET ORAL NIGHTLY
Qty: 30 TABLET | Refills: 0 | OUTPATIENT
Start: 2024-11-11

## 2024-11-11 RX ORDER — AMLODIPINE BESYLATE 5 MG/1
5 TABLET ORAL NIGHTLY
Qty: 30 TABLET | Refills: 0 | OUTPATIENT
Start: 2024-11-11

## 2024-11-11 RX ORDER — LISINOPRIL 40 MG/1
40 TABLET ORAL DAILY
Qty: 30 TABLET | Refills: 0 | OUTPATIENT
Start: 2024-11-11

## 2024-11-11 RX ORDER — BLOOD SUGAR DIAGNOSTIC
STRIP MISCELLANEOUS
Qty: 50 EACH | Refills: 0 | OUTPATIENT
Start: 2024-11-11

## 2024-11-12 DIAGNOSIS — I10 ESSENTIAL HYPERTENSION: ICD-10-CM

## 2024-11-12 DIAGNOSIS — J45.40 MODERATE PERSISTENT ASTHMA WITHOUT COMPLICATION: ICD-10-CM

## 2024-11-12 DIAGNOSIS — E55.9 VITAMIN D DEFICIENCY: ICD-10-CM

## 2024-11-12 DIAGNOSIS — E11.8 TYPE 2 DIABETES MELLITUS WITH COMPLICATION, WITHOUT LONG-TERM CURRENT USE OF INSULIN (HCC): ICD-10-CM

## 2024-11-13 RX ORDER — LISINOPRIL 40 MG/1
40 TABLET ORAL DAILY
Qty: 30 TABLET | Refills: 0 | Status: SHIPPED | OUTPATIENT
Start: 2024-11-13

## 2024-11-13 RX ORDER — MONTELUKAST SODIUM 10 MG/1
10 TABLET ORAL NIGHTLY
Qty: 30 TABLET | Refills: 0 | Status: SHIPPED | OUTPATIENT
Start: 2024-11-13

## 2024-11-13 RX ORDER — ERGOCALCIFEROL 1.25 MG/1
50000 CAPSULE, LIQUID FILLED ORAL WEEKLY
Qty: 4 CAPSULE | Refills: 0 | Status: SHIPPED | OUTPATIENT
Start: 2024-11-13

## 2024-11-13 NOTE — TELEPHONE ENCOUNTER
Medication:   Requested Prescriptions     Pending Prescriptions Disp Refills    lisinopril (PRINIVIL;ZESTRIL) 40 MG tablet [Pharmacy Med Name: Lisinopril 40 MG Oral Tablet] 30 tablet 0     Sig: Take 1 tablet by mouth once daily    metFORMIN (GLUCOPHAGE) 1000 MG tablet [Pharmacy Med Name: metFORMIN HCl 1000 MG Oral Tablet] 60 tablet 0     Sig: TAKE 1 TABLET BY MOUTH TWICE DAILY WITH MEALS    montelukast (SINGULAIR) 10 MG tablet [Pharmacy Med Name: Montelukast Sodium 10 MG Oral Tablet] 30 tablet 0     Sig: Take 1 tablet by mouth nightly    vitamin D (ERGOCALCIFEROL) 1.25 MG (77121 UT) CAPS capsule [Pharmacy Med Name: Vitamin D (Ergocalciferol) 74171 UNIT Oral Capsule] 4 capsule 0     Sig: Take 1 capsule by mouth once a week        Last Filled:      Patient Phone Number: [phone] (home)     Last appt: 5/29/2024   Next appt: Visit date not found    Last OARRS:        No data to display

## 2024-11-14 DIAGNOSIS — E11.8 TYPE 2 DIABETES MELLITUS WITH COMPLICATION, WITHOUT LONG-TERM CURRENT USE OF INSULIN (HCC): ICD-10-CM

## 2024-11-14 NOTE — TELEPHONE ENCOUNTER
Medication:   Requested Prescriptions     Pending Prescriptions Disp Refills    JARDIANCE 10 MG tablet [Pharmacy Med Name: JARDIANCE 10MG TABLETS] 90 tablet 3     Sig: TAKE 1 TABLET BY MOUTH DAILY       Last Filled:      Patient Phone Number: [phone] (home)     Last appt: 5/29/2024   Next appt: Visit date not found    Last Labs DM:   Lab Results   Component Value Date/Time    LABA1C 7.6 01/23/2024 10:03 AM

## 2024-11-15 RX ORDER — EMPAGLIFLOZIN 10 MG/1
10 TABLET, FILM COATED ORAL DAILY
Qty: 90 TABLET | Refills: 3 | Status: SHIPPED | OUTPATIENT
Start: 2024-11-15

## 2024-11-16 DIAGNOSIS — E11.9 TYPE 2 DIABETES MELLITUS WITHOUT COMPLICATION, WITHOUT LONG-TERM CURRENT USE OF INSULIN (HCC): ICD-10-CM

## 2024-11-18 NOTE — TELEPHONE ENCOUNTER
Medication:   Requested Prescriptions     Pending Prescriptions Disp Refills    glipiZIDE (GLUCOTROL XL) 10 MG extended release tablet [Pharmacy Med Name: GLIPIZIDE ER 10MG TABLETS] 180 tablet 1     Sig: TAKE 1 TABLET BY MOUTH IN THE MORNING AND IN THE EVENING       Last Filled:      Patient Phone Number: [phone] (home)     Last appt: 5/29/2024   Next appt: Visit date not found    Last Labs DM:   Lab Results   Component Value Date/Time    LABA1C 7.6 01/23/2024 10:03 AM

## 2024-11-19 RX ORDER — GLIPIZIDE 10 MG/1
TABLET, FILM COATED, EXTENDED RELEASE ORAL
Qty: 180 TABLET | Refills: 1 | Status: SHIPPED | OUTPATIENT
Start: 2024-11-19

## 2024-11-20 ENCOUNTER — TELEMEDICINE (OUTPATIENT)
Dept: PRIMARY CARE CLINIC | Age: 64
End: 2024-11-20

## 2024-11-20 DIAGNOSIS — E55.9 VITAMIN D DEFICIENCY: ICD-10-CM

## 2024-11-20 DIAGNOSIS — E03.9 ACQUIRED HYPOTHYROIDISM: ICD-10-CM

## 2024-11-20 DIAGNOSIS — E78.00 PURE HYPERCHOLESTEROLEMIA: ICD-10-CM

## 2024-11-20 DIAGNOSIS — N64.4 BREAST PAIN: ICD-10-CM

## 2024-11-20 DIAGNOSIS — I10 ESSENTIAL HYPERTENSION: ICD-10-CM

## 2024-11-20 DIAGNOSIS — Z12.11 COLON CANCER SCREENING: ICD-10-CM

## 2024-11-20 DIAGNOSIS — E11.8 TYPE 2 DIABETES MELLITUS WITH COMPLICATION, WITHOUT LONG-TERM CURRENT USE OF INSULIN (HCC): ICD-10-CM

## 2024-11-20 DIAGNOSIS — M79.2 RADICULAR PAIN IN RIGHT ARM: Primary | ICD-10-CM

## 2024-11-20 LAB
25(OH)D3 SERPL-MCNC: 62.5 NG/ML
ALBUMIN SERPL-MCNC: 4.5 G/DL (ref 3.4–5)
ALBUMIN/GLOB SERPL: 2.6 {RATIO} (ref 1.1–2.2)
ALP SERPL-CCNC: 61 U/L (ref 40–129)
ALT SERPL-CCNC: 34 U/L (ref 10–40)
ANION GAP SERPL CALCULATED.3IONS-SCNC: 12 MMOL/L (ref 3–16)
AST SERPL-CCNC: 30 U/L (ref 15–37)
BASOPHILS # BLD: 0 K/UL (ref 0–0.2)
BASOPHILS NFR BLD: 0.3 %
BILIRUB SERPL-MCNC: <0.2 MG/DL (ref 0–1)
BUN SERPL-MCNC: 18 MG/DL (ref 7–20)
CALCIUM SERPL-MCNC: 9.8 MG/DL (ref 8.3–10.6)
CHLORIDE SERPL-SCNC: 102 MMOL/L (ref 99–110)
CHOLEST SERPL-MCNC: 124 MG/DL (ref 0–199)
CO2 SERPL-SCNC: 27 MMOL/L (ref 21–32)
CREAT SERPL-MCNC: 0.5 MG/DL (ref 0.6–1.2)
CREAT UR-MCNC: 84.3 MG/DL (ref 28–259)
DEPRECATED RDW RBC AUTO: 13.4 % (ref 12.4–15.4)
EOSINOPHIL # BLD: 0.5 K/UL (ref 0–0.6)
EOSINOPHIL NFR BLD: 6.3 %
FOLATE SERPL-MCNC: 23.7 NG/ML (ref 4.78–24.2)
GFR SERPLBLD CREATININE-BSD FMLA CKD-EPI: >90 ML/MIN/{1.73_M2}
GLUCOSE SERPL-MCNC: 101 MG/DL (ref 70–99)
HCT VFR BLD AUTO: 37.7 % (ref 36–48)
HDLC SERPL-MCNC: 70 MG/DL (ref 40–60)
HGB BLD-MCNC: 12.3 G/DL (ref 12–16)
LDLC SERPL CALC-MCNC: 40 MG/DL
LYMPHOCYTES # BLD: 2.1 K/UL (ref 1–5.1)
LYMPHOCYTES NFR BLD: 25.6 %
MCH RBC QN AUTO: 27.3 PG (ref 26–34)
MCHC RBC AUTO-ENTMCNC: 32.6 G/DL (ref 31–36)
MCV RBC AUTO: 83.8 FL (ref 80–100)
MICROALBUMIN UR DL<=1MG/L-MCNC: <1.2 MG/DL
MICROALBUMIN/CREAT UR: NORMAL MG/G (ref 0–30)
MONOCYTES # BLD: 0.7 K/UL (ref 0–1.3)
MONOCYTES NFR BLD: 8.8 %
NEUTROPHILS # BLD: 4.8 K/UL (ref 1.7–7.7)
NEUTROPHILS NFR BLD: 59 %
PLATELET # BLD AUTO: 287 K/UL (ref 135–450)
PMV BLD AUTO: 8 FL (ref 5–10.5)
POTASSIUM SERPL-SCNC: 4.4 MMOL/L (ref 3.5–5.1)
PROT SERPL-MCNC: 6.2 G/DL (ref 6.4–8.2)
RBC # BLD AUTO: 4.5 M/UL (ref 4–5.2)
SODIUM SERPL-SCNC: 141 MMOL/L (ref 136–145)
TRIGL SERPL-MCNC: 69 MG/DL (ref 0–150)
TSH SERPL DL<=0.005 MIU/L-ACNC: 1.51 UIU/ML (ref 0.27–4.2)
VIT B12 SERPL-MCNC: 463 PG/ML (ref 211–911)
VLDLC SERPL CALC-MCNC: 14 MG/DL
WBC # BLD AUTO: 8.1 K/UL (ref 4–11)

## 2024-11-20 NOTE — PROGRESS NOTES
Ronle Antonio, was evaluated through a synchronous (real-time) audio-video encounter. The patient (or guardian if applicable) is aware that this is a billable service, which includes applicable co-pays. This Virtual Visit was conducted with patient's (and/or legal guardian's) consent. Patient identification was verified, and a caregiver was present when appropriate.   The patient was located at Home: [facility] [address]  Provider was located at Facility (Appt Dept): [address] [place] [id number]  Confirm you are appropriately licensed, registered, or certified to deliver care in the state where the patient is located as indicated above. If you are not or unsure, please re-schedule the visit: Yes, I confirm.     Ronel Antonio (:  1960) is a Established patient, presenting virtually for evaluation of the following:      Below is the assessment and plan developed based on review of pertinent history, physical exam, labs, studies, and medications.     Assessment & Plan  Radicular pain in right arm    Chronic and getting worse consistent with radiculopathy.  Will get EMG and cervical spine xrays.     Orders:    Issac Rosas MD, Neurology, [facility]    XR CERVICAL SPINE (2-3 VIEWS); Future    Breast pain   New, uncertain prognosis, will get diagnostic mammogram.  Patient has not felt any lumps.    Orders:    [facility] VERENA DIGITAL DIAGNOSTIC BILATERAL; Future    Colon cancer screening    Needed, will order cologuard.     Orders:    Fecal DNA Colorectal cancer screening (Cologuard)      Return in about 6 weeks (around 2025).       Subjective   Breast Pain  Chronicity:  New  Associated Symptoms: breast pain and tenderness    Associated Symptoms: no breast mass, no breast discharge, no breast redness, no skin change and no swelling    Affected side:  Both  Onset:  More than 1 month ago  Progression since onset:  Unchanged  Currently pregnant:  No  Current birth

## 2024-11-21 LAB
EST. AVERAGE GLUCOSE BLD GHB EST-MCNC: 165.7 MG/DL
HBA1C MFR BLD: 7.4 %

## 2024-11-22 LAB — FRUCTOSAMINE SERPL-SCNC: 244 UMOL/L (ref 205–285)

## 2024-11-22 NOTE — RESULT ENCOUNTER NOTE
Your cholesterol was very good.  Continue your current regimen.  Normal kidney and liver blood test.  There was no protein in the urine which means the kidneys are working well.  The A1c is improving and has come down to 7.4.  Previously it was 7.6 continue your current regimen.  Since you had been on the Rybelsus for a longer period of time see if you can tolerate 2 of the 7 mg tablets daily.  If it causes any GI upset, go back to the one 7 mg tablet daily

## 2024-11-23 NOTE — RESULT ENCOUNTER NOTE
This will make you feel much better.  The Fructosamine level which monitors the blood sugar control over the past 3 weeks was in the normal nondiabetic range.  Continue current regimen.

## 2024-12-05 ENCOUNTER — HOSPITAL ENCOUNTER (OUTPATIENT)
Dept: PULMONOLOGY | Age: 64
Discharge: HOME | End: 2024-12-05
Attending: INTERNAL MEDICINE
Payer: COMMERCIAL

## 2024-12-05 VITALS — OXYGEN SATURATION: 95 % | RESPIRATION RATE: 16 BRPM | HEART RATE: 85 BPM

## 2024-12-05 DIAGNOSIS — R06.02 SHORTNESS OF BREATH: ICD-10-CM

## 2024-12-05 LAB
DLCO %PRED: 94 %
DLCO PRED: NORMAL
DLCO/VA %PRED: NORMAL
DLCO/VA PRED: NORMAL
DLCO/VA: NORMAL
DLCO: NORMAL
EXPIRATORY TIME-POST: NORMAL
EXPIRATORY TIME: NORMAL
FEF 25-75 %CHNG: NORMAL
FEF 25-75 POST %PRED: NORMAL
FEF 25-75% %PRED-PRE: NORMAL
FEF 25-75% PRED: NORMAL
FEF 25-75-POST: NORMAL
FEF 25-75-PRE: NORMAL
FEV1 %PRED-POST: 85 %
FEV1 %PRED-PRE: 78 %
FEV1 PRED: NORMAL
FEV1-POST: NORMAL
FEV1-PRE: NORMAL
FEV1/FVC %PRED-POST: NORMAL
FEV1/FVC %PRED-PRE: NORMAL
FEV1/FVC PRED: NORMAL
FEV1/FVC-POST: 78 %
FEV1/FVC-PRE: 71 %
FVC %PRED-POST: NORMAL
FVC %PRED-PRE: NORMAL
FVC PRED: NORMAL
FVC-POST: NORMAL
FVC-PRE: NORMAL
GAW %PRED: NORMAL
GAW PRED: NORMAL
GAW: NORMAL
IC PRE %PRED: NORMAL
IC PRED: NORMAL
IC: NORMAL
MEP: NORMAL
MIP: NORMAL
MVV %PRED-PRE: NORMAL
MVV PRED: NORMAL
MVV-PRE: NORMAL
PEF %PRED-POST: NORMAL
PEF %PRED-PRE: NORMAL
PEF PRED: NORMAL
PEF%CHNG: NORMAL
PEF-POST: NORMAL
PEF-PRE: NORMAL
RAW %PRED: NORMAL
RAW PRED: NORMAL
RAW: NORMAL
RV PRE %PRED: NORMAL
RV PRED: NORMAL
RV: NORMAL
SVC %PRED: NORMAL
SVC PRED: NORMAL
SVC: NORMAL
TLC PRE %PRED: 81 %
TLC PRED: NORMAL
TLC: NORMAL
VA %PRED: NORMAL
VA PRED: NORMAL
VA: NORMAL
VTG %PRED: NORMAL
VTG PRED: NORMAL
VTG: NORMAL

## 2024-12-05 PROCEDURE — 94060 EVALUATION OF WHEEZING: CPT

## 2024-12-05 PROCEDURE — 94760 N-INVAS EAR/PLS OXIMETRY 1: CPT

## 2024-12-05 PROCEDURE — 94726 PLETHYSMOGRAPHY LUNG VOLUMES: CPT

## 2024-12-05 PROCEDURE — 94729 DIFFUSING CAPACITY: CPT

## 2024-12-05 PROCEDURE — 6370000000 HC RX 637 (ALT 250 FOR IP): Performed by: INTERNAL MEDICINE

## 2024-12-05 RX ORDER — ALBUTEROL SULFATE 90 UG/1
4 INHALANT RESPIRATORY (INHALATION) ONCE
Status: COMPLETED | OUTPATIENT
Start: 2024-12-05 | End: 2024-12-05

## 2024-12-05 RX ADMIN — Medication 4 PUFF: at 11:16

## 2024-12-05 ASSESSMENT — PULMONARY FUNCTION TESTS
FEV1_PERCENT_PREDICTED_POST: 85
FEV1/FVC_POST: 78
FEV1/FVC_PRE: 71
FEV1_PERCENT_PREDICTED_PRE: 78

## 2024-12-06 DIAGNOSIS — E55.9 VITAMIN D DEFICIENCY: ICD-10-CM

## 2024-12-06 NOTE — PROCEDURES
Pulmonary Function Testing      Patient name:  Ronel Antonio      Unit #:   [id number]   Date of test:  12/5/2024   Date of interpretation:   12/6/2024    Ms. Ronel Antonio is a 64 y.o. year-old non smoker. The spirometry data were acceptable and reproducible.     Spirometry:  Flow volume loops were obstructed. The FEV-1/FVC ratio was normal. The pre-bronchodilator FEV-1 was 1.64 liters (78% of predicted), which was moderately decreased. The FVC was 2.31 liters (88% of predicted), which was normal. Response to inhaled bronchodilators (albuterol) was not significant.    Lung volumes:  Lung volumes were tested by plethysmography. The total lung capacity was 3.85 liters (81% of predicted), which was normal. The residual volume was 1.49 liters (74% of predicted), which was decreased. The ratio of residual volume to total lung capacity (RV/TLC) was 39, which was normal.     Diffusion capacity was found to be 94% predicted which is Normal.      Interpretation:  Overall normal PFT study.    Comments:

## 2024-12-06 NOTE — TELEPHONE ENCOUNTER
Medication:   Requested Prescriptions     Pending Prescriptions Disp Refills    vitamin D (ERGOCALCIFEROL) 1.25 MG (59484 UT) CAPS capsule [Pharmacy Med Name: Vitamin D (Ergocalciferol) 60117 UNIT Oral Capsule] 4 capsule 0     Sig: Take 1 capsule by mouth once a week       Last Filled:      Patient Phone Number: [phone] (home)     Last appt: 11/20/2024   Next appt: Visit date not found    Last Labs DM:   Lab Results   Component Value Date/Time    VITD25 62.5 11/20/2024 08:18 AM    VITD25 37.4 10/23/2023 11:18 AM

## 2024-12-09 RX ORDER — ERGOCALCIFEROL 1.25 MG/1
50000 CAPSULE, LIQUID FILLED ORAL WEEKLY
Qty: 4 CAPSULE | Refills: 0 | Status: SHIPPED | OUTPATIENT
Start: 2024-12-09 | End: 2024-12-13

## 2024-12-11 ENCOUNTER — PROCEDURE VISIT (OUTPATIENT)
Dept: NEUROLOGY | Age: 64
End: 2024-12-11

## 2024-12-11 DIAGNOSIS — R20.0 NUMBNESS AND TINGLING IN BOTH HANDS: Primary | ICD-10-CM

## 2024-12-11 DIAGNOSIS — R20.2 NUMBNESS AND TINGLING IN BOTH HANDS: Primary | ICD-10-CM

## 2024-12-11 NOTE — PATIENT INSTRUCTIONS
Verbal consent was obtained from patient and/or patient's advocate for in office procedure with Dr. Issac Gardner MD (EMG or EEG).

## 2024-12-11 NOTE — PROGRESS NOTES
Dear Freda, Svetlana Tucker MD  [address] [place] [address]    I had the pleasure of seeing your patient Ronel Antonio  today for EMG and NCV. I have attached a detailed summary below:        Electromyography report        [facility]  [address], [address]  [place] [address]      Patient: Ronel Antonio    MR Number: [id number]  YOB: 1960  Date of Visit: 12/11/2024    Clinical history:  The patient is a 64 y.o. years old female chronic bilateral upper extremity and hand numbness and tingling with neck pain.  Exam: No sensory deficit or weakness.  No atrophy.  2+ DTRs.    Findings:   For full details about such findings, please see attached report. Needle examination was recorded using monopolar needle in selected muscles. Unless otherwise noted, the temperature of the limb was monitored and maintained between 32-36°C during the performance of the NCV testing.     1.  Left median sensory distal latency, amplitude and conduction velocity were within normal limit  2.  Right median sensroy distal latency, amplitude and conduction velocity were within normal  3.  Left ulnar sensory distal latency, amplitude and conduction velocity were within normal limit  4.  Right ulnar sensory distal latency, amplitude and conduction velocity were within normal limit  5.  Left median motor distal latency, amplitude and conduction velocity were within normal limits  6.  Right median motor distal latency, amplitude and conduction velocity were within normal limit   7.  Left ulnar motor distal latency, amplitude and conduction velocity were within normal limit  8.  Right ulnar motor distal latency, amplitude and conduction velocity were within normal limit.  9.  Right radial sensory distal latency, amplitude and conduction velocity were within normal limit  10. Needle examinations of bilateral upper extremities were performed in selected muscles. Needle examination of these

## 2024-12-12 ENCOUNTER — HOSPITAL ENCOUNTER (OUTPATIENT)
Age: 64
Discharge: HOME | End: 2024-12-14
Attending: INTERNAL MEDICINE
Payer: MEDICARE

## 2024-12-12 VITALS
HEART RATE: 71 BPM | SYSTOLIC BLOOD PRESSURE: 166 MMHG | WEIGHT: 163 LBS | BODY MASS INDEX: 30 KG/M2 | DIASTOLIC BLOOD PRESSURE: 66 MMHG | RESPIRATION RATE: 18 BRPM | HEIGHT: 62 IN

## 2024-12-12 VITALS — BODY MASS INDEX: 30 KG/M2 | WEIGHT: 163 LBS | HEIGHT: 62 IN

## 2024-12-12 DIAGNOSIS — R06.02 SHORTNESS OF BREATH: ICD-10-CM

## 2024-12-12 DIAGNOSIS — E11.9 TYPE 2 DIABETES MELLITUS WITHOUT COMPLICATION, WITHOUT LONG-TERM CURRENT USE OF INSULIN (HCC): ICD-10-CM

## 2024-12-12 DIAGNOSIS — E11.8 TYPE 2 DIABETES MELLITUS WITH COMPLICATION, WITHOUT LONG-TERM CURRENT USE OF INSULIN (HCC): ICD-10-CM

## 2024-12-12 DIAGNOSIS — J45.40 MODERATE PERSISTENT ASTHMA WITHOUT COMPLICATION: ICD-10-CM

## 2024-12-12 DIAGNOSIS — E55.9 VITAMIN D DEFICIENCY: ICD-10-CM

## 2024-12-12 DIAGNOSIS — I10 ESSENTIAL HYPERTENSION: ICD-10-CM

## 2024-12-12 LAB
ECHO BSA: 1.8 M2
NUC STRESS EJECTION FRACTION: 68 %
NUC STRESS LV EDV: 85 ML (ref 56–104)
NUC STRESS LV ESV: 27 ML (ref 19–49)
NUC STRESS LV MASS: 128 G
STRESS BASELINE DIAS BP: 66 MMHG
STRESS BASELINE HR: 73 BPM
STRESS BASELINE SYS BP: 166 MMHG
STRESS ESTIMATED WORKLOAD: 7 METS
STRESS EXERCISE DUR MIN: 4 MIN
STRESS EXERCISE DUR SEC: 22 SEC
STRESS O2 SAT PEAK: 96 %
STRESS O2 SAT REST: 98 %
STRESS PEAK DIAS BP: 45 MMHG
STRESS PEAK SYS BP: 230 MMHG
STRESS PERCENT HR ACHIEVED: 88 %
STRESS POST PEAK HR: 137 BPM
STRESS RATE PRESSURE PRODUCT: NORMAL BPM*MMHG
STRESS TARGET HR: 155 BPM
TID: 1.02

## 2024-12-12 PROCEDURE — A9502 TC99M TETROFOSMIN: HCPCS | Performed by: INTERNAL MEDICINE

## 2024-12-12 PROCEDURE — 93018 CV STRESS TEST I&R ONLY: CPT | Performed by: INTERNAL MEDICINE

## 2024-12-12 PROCEDURE — 78452 HT MUSCLE IMAGE SPECT MULT: CPT

## 2024-12-12 PROCEDURE — 93017 CV STRESS TEST TRACING ONLY: CPT

## 2024-12-12 PROCEDURE — 93016 CV STRESS TEST SUPVJ ONLY: CPT | Performed by: INTERNAL MEDICINE

## 2024-12-12 PROCEDURE — 3430000000 HC RX DIAGNOSTIC RADIOPHARMACEUTICAL: Performed by: INTERNAL MEDICINE

## 2024-12-12 PROCEDURE — 78452 HT MUSCLE IMAGE SPECT MULT: CPT | Performed by: INTERNAL MEDICINE

## 2024-12-12 RX ADMIN — TETROFOSMIN 11.1 MILLICURIE: 1.38 INJECTION, POWDER, LYOPHILIZED, FOR SOLUTION INTRAVENOUS at 07:30

## 2024-12-12 RX ADMIN — TETROFOSMIN 34.2 MILLICURIE: 1.38 INJECTION, POWDER, LYOPHILIZED, FOR SOLUTION INTRAVENOUS at 08:40

## 2024-12-13 DIAGNOSIS — J30.89 OTHER ALLERGIC RHINITIS: ICD-10-CM

## 2024-12-13 DIAGNOSIS — B37.2 SKIN CANDIDIASIS: ICD-10-CM

## 2024-12-13 RX ORDER — ERGOCALCIFEROL 1.25 MG/1
50000 CAPSULE, LIQUID FILLED ORAL WEEKLY
Qty: 4 CAPSULE | Refills: 0 | Status: SHIPPED | OUTPATIENT
Start: 2024-12-13

## 2024-12-13 RX ORDER — LISINOPRIL 40 MG/1
40 TABLET ORAL DAILY
Qty: 30 TABLET | Refills: 0 | Status: SHIPPED | OUTPATIENT
Start: 2024-12-13

## 2024-12-13 RX ORDER — BLOOD SUGAR DIAGNOSTIC
STRIP MISCELLANEOUS
Qty: 50 EACH | Refills: 0 | Status: SHIPPED | OUTPATIENT
Start: 2024-12-13

## 2024-12-13 RX ORDER — MONTELUKAST SODIUM 10 MG/1
10 TABLET ORAL NIGHTLY
Qty: 30 TABLET | Refills: 0 | Status: SHIPPED | OUTPATIENT
Start: 2024-12-13

## 2024-12-13 RX ORDER — AMLODIPINE BESYLATE 5 MG/1
5 TABLET ORAL NIGHTLY
Qty: 30 TABLET | Refills: 0 | Status: SHIPPED | OUTPATIENT
Start: 2024-12-13

## 2024-12-13 NOTE — TELEPHONE ENCOUNTER
Medication:   Requested Prescriptions     Pending Prescriptions Disp Refills    montelukast (SINGULAIR) 10 MG tablet [Pharmacy Med Name: Montelukast Sodium 10 MG Oral Tablet] 30 tablet 0     Sig: Take 1 tablet by mouth nightly    lisinopril (PRINIVIL;ZESTRIL) 40 MG tablet [Pharmacy Med Name: Lisinopril 40 MG Oral Tablet] 30 tablet 0     Sig: Take 1 tablet by mouth once daily    vitamin D (ERGOCALCIFEROL) 1.25 MG (59507 UT) CAPS capsule [Pharmacy Med Name: Vitamin D (Ergocalciferol) 59708 UNIT Oral Capsule] 4 capsule 0     Sig: Take 1 capsule by mouth once a week    metFORMIN (GLUCOPHAGE) 1000 MG tablet [Pharmacy Med Name: metFORMIN HCl 1000 MG Oral Tablet] 60 tablet 0     Sig: TAKE 1 TABLET BY MOUTH TWICE DAILY WITH MEALS        Last Filled:      Patient Phone Number: [phone] (home)     Last appt: 11/20/2024   Next appt: 12/12/2024    Last OARRS:        No data to display

## 2024-12-13 NOTE — TELEPHONE ENCOUNTER
Medication:   Requested Prescriptions     Pending Prescriptions Disp Refills    fluticasone (FLONASE) 50 MCG/ACT nasal spray 48 g 5     Si spray by Nasal route daily    ketoconazole (NIZORAL) 2 % cream 60 g 3     Sig: APPLY TOPICALLY TO THE AFFECTED AREA DAILY        Last Filled:      Patient Phone Number: [phone] (home)     Last appt: 2024   Next appt: Visit date not found    Last OARRS:        No data to display

## 2024-12-13 NOTE — TELEPHONE ENCOUNTER
Medication:   Requested Prescriptions     Pending Prescriptions Disp Refills    amLODIPine (NORVASC) 5 MG tablet [Pharmacy Med Name: amLODIPine Besylate 5 MG Oral Tablet] 30 tablet 0     Sig: TAKE 1 TABLET BY MOUTH ONCE DAILY IN THE EVENING    ONETOUCH ULTRA TEST strip [Pharmacy Med Name: OneTouch Ultra Blue In Vitro Strip] 50 each 0     Sig: USE 1 STRIP TO CHECK GLUCOSE TWICE DAILY AS NEEDED        Last Filled:      Patient Phone Number: [phone] (home)     Last appt: 11/20/2024   Next appt: Visit date not found    Last OARRS:        No data to display

## 2024-12-16 RX ORDER — KETOCONAZOLE 20 MG/G
CREAM TOPICAL
Qty: 60 G | Refills: 3 | Status: SHIPPED | OUTPATIENT
Start: 2024-12-16

## 2024-12-16 RX ORDER — FLUTICASONE PROPIONATE 50 MCG
1 SPRAY, SUSPENSION (ML) NASAL DAILY
Qty: 48 G | Refills: 5 | Status: SHIPPED | OUTPATIENT
Start: 2024-12-16

## 2024-12-16 ASSESSMENT — ENCOUNTER SYMPTOMS
NAUSEA: 0
SORE THROAT: 0
COUGH: 0
TROUBLE SWALLOWING: 0
WHEEZING: 0
EYES NEGATIVE: 1
BREAST PAIN: 1
SHORTNESS OF BREATH: 0
RHINORRHEA: 0

## 2024-12-17 ENCOUNTER — HOSPITAL ENCOUNTER (OUTPATIENT)
Dept: ULTRASOUND IMAGING | Age: 64
Discharge: HOME | End: 2024-12-17
Payer: MEDICARE

## 2024-12-17 ENCOUNTER — HOSPITAL ENCOUNTER (OUTPATIENT)
Dept: WOMENS IMAGING | Age: 64
Discharge: HOME | End: 2024-12-17
Payer: MEDICARE

## 2024-12-17 VITALS — BODY MASS INDEX: 30 KG/M2 | HEIGHT: 62 IN | WEIGHT: 163 LBS

## 2024-12-17 DIAGNOSIS — N64.4 BREAST PAIN: ICD-10-CM

## 2024-12-17 PROCEDURE — 76642 ULTRASOUND BREAST LIMITED: CPT

## 2024-12-17 PROCEDURE — G0279 TOMOSYNTHESIS, MAMMO: HCPCS

## 2024-12-19 NOTE — RESULT ENCOUNTER NOTE
Bilateral mammogram and breast ultrasound did not show signs of cancer.  Avoid all caffeine and if not resolved in 4 weeks we will send you to the breast specialist.

## 2024-12-19 NOTE — RESULT ENCOUNTER NOTE
Mammogram and breast ultrasound did not show signs of cancer.  Continue monthly breast self exams.  If breast pain is not resolved within a month with stopping caffeine, let me know and I will refer you to breast specialist.

## 2024-12-19 NOTE — RESULT ENCOUNTER NOTE
Your mammogram did not show signs of cancer.  You should have a repeat mammogram in one year.  Remember to do monthly self breast exams. Notify me if you notice any lumps, hardening or the skin or changes.      If the breast pain does not resolve with stopping caffeine, let me know and I will refer you to the breast specialist.  Let me know if symptoms have not resolved over the next 3 weeks.

## 2025-01-14 DIAGNOSIS — E11.8 TYPE 2 DIABETES MELLITUS WITH COMPLICATION, WITHOUT LONG-TERM CURRENT USE OF INSULIN (HCC): ICD-10-CM

## 2025-01-14 DIAGNOSIS — J45.40 MODERATE PERSISTENT ASTHMA WITHOUT COMPLICATION: ICD-10-CM

## 2025-01-14 DIAGNOSIS — E55.9 VITAMIN D DEFICIENCY: ICD-10-CM

## 2025-01-14 DIAGNOSIS — I10 ESSENTIAL HYPERTENSION: ICD-10-CM

## 2025-01-14 RX ORDER — LISINOPRIL 40 MG/1
40 TABLET ORAL DAILY
Qty: 30 TABLET | Refills: 5 | Status: SHIPPED | OUTPATIENT
Start: 2025-01-14

## 2025-01-14 RX ORDER — ERGOCALCIFEROL 1.25 MG/1
50000 CAPSULE, LIQUID FILLED ORAL WEEKLY
Qty: 4 CAPSULE | Refills: 5 | Status: SHIPPED | OUTPATIENT
Start: 2025-01-14

## 2025-01-14 RX ORDER — MONTELUKAST SODIUM 10 MG/1
10 TABLET ORAL NIGHTLY
Qty: 30 TABLET | Refills: 5 | Status: SHIPPED | OUTPATIENT
Start: 2025-01-14

## 2025-01-14 NOTE — TELEPHONE ENCOUNTER
Medication:   Requested Prescriptions     Pending Prescriptions Disp Refills    metFORMIN (GLUCOPHAGE) 1000 MG tablet [Pharmacy Med Name: metFORMIN HCl 1000 MG Oral Tablet] 60 tablet 0     Sig: TAKE 1 TABLET BY MOUTH TWICE DAILY WITH MEALS    montelukast (SINGULAIR) 10 MG tablet [Pharmacy Med Name: Montelukast Sodium 10 MG Oral Tablet] 30 tablet 0     Sig: Take 1 tablet by mouth nightly    lisinopril (PRINIVIL;ZESTRIL) 40 MG tablet [Pharmacy Med Name: Lisinopril 40 MG Oral Tablet] 30 tablet 0     Sig: Take 1 tablet by mouth once daily    vitamin D (ERGOCALCIFEROL) 1.25 MG (71554 UT) CAPS capsule [Pharmacy Med Name: Vitamin D (Ergocalciferol) 13847 UNIT Oral Capsule] 4 capsule 0     Sig: Take 1 capsule by mouth once a week        Last Filled:      Patient Phone Number: [phone] (home)     Last appt: 11/20/2024   Next appt: Visit date not found    Last OARRS:        No data to display

## 2025-02-08 DIAGNOSIS — I10 ESSENTIAL HYPERTENSION: ICD-10-CM

## 2025-02-10 NOTE — TELEPHONE ENCOUNTER
Medication:   Requested Prescriptions     Pending Prescriptions Disp Refills    amLODIPine (NORVASC) 5 MG tablet [Pharmacy Med Name: amLODIPine Besylate 5 MG Oral Tablet] 30 tablet 0     Sig: TAKE 1 TABLET BY MOUTH ONCE DAILY IN THE EVENING        Last Filled:      Patient Phone Number: [phone] (home)     Last appt: 11/20/2024   Next appt: Visit date not found    Last OARRS:        No data to display

## 2025-02-11 RX ORDER — AMLODIPINE BESYLATE 5 MG/1
5 TABLET ORAL NIGHTLY
Qty: 30 TABLET | Refills: 5 | Status: SHIPPED | OUTPATIENT
Start: 2025-02-11

## 2025-03-31 ENCOUNTER — OFFICE VISIT (OUTPATIENT)
Dept: PRIMARY CARE CLINIC | Age: 65
End: 2025-03-31
Payer: MEDICARE

## 2025-03-31 VITALS
WEIGHT: 169.2 LBS | OXYGEN SATURATION: 98 % | DIASTOLIC BLOOD PRESSURE: 74 MMHG | TEMPERATURE: 97.7 F | RESPIRATION RATE: 16 BRPM | HEART RATE: 74 BPM | HEIGHT: 62 IN | BODY MASS INDEX: 31.14 KG/M2 | SYSTOLIC BLOOD PRESSURE: 116 MMHG

## 2025-03-31 DIAGNOSIS — L98.9 SCALP LESION: ICD-10-CM

## 2025-03-31 DIAGNOSIS — E11.8 TYPE 2 DIABETES MELLITUS WITH COMPLICATION, WITHOUT LONG-TERM CURRENT USE OF INSULIN (HCC): Primary | ICD-10-CM

## 2025-03-31 DIAGNOSIS — M05.79 RHEUMATOID ARTHRITIS INVOLVING MULTIPLE SITES WITH POSITIVE RHEUMATOID FACTOR (HCC): ICD-10-CM

## 2025-03-31 DIAGNOSIS — K62.5 RECTAL BLEEDING: ICD-10-CM

## 2025-03-31 DIAGNOSIS — L98.9 SORE ON SCALP: ICD-10-CM

## 2025-03-31 LAB — HBA1C MFR BLD: 7.8 %

## 2025-03-31 PROCEDURE — 3078F DIAST BP <80 MM HG: CPT | Performed by: INTERNAL MEDICINE

## 2025-03-31 PROCEDURE — 3051F HG A1C>EQUAL 7.0%<8.0%: CPT | Performed by: INTERNAL MEDICINE

## 2025-03-31 PROCEDURE — 1123F ACP DISCUSS/DSCN MKR DOCD: CPT | Performed by: INTERNAL MEDICINE

## 2025-03-31 PROCEDURE — 1090F PRES/ABSN URINE INCON ASSESS: CPT | Performed by: INTERNAL MEDICINE

## 2025-03-31 PROCEDURE — 3074F SYST BP LT 130 MM HG: CPT | Performed by: INTERNAL MEDICINE

## 2025-03-31 PROCEDURE — 1036F TOBACCO NON-USER: CPT | Performed by: INTERNAL MEDICINE

## 2025-03-31 PROCEDURE — 83036 HEMOGLOBIN GLYCOSYLATED A1C: CPT | Performed by: INTERNAL MEDICINE

## 2025-03-31 PROCEDURE — G8417 CALC BMI ABV UP PARAM F/U: HCPCS | Performed by: INTERNAL MEDICINE

## 2025-03-31 PROCEDURE — 2022F DILAT RTA XM EVC RTNOPTHY: CPT | Performed by: INTERNAL MEDICINE

## 2025-03-31 PROCEDURE — G8399 PT W/DXA RESULTS DOCUMENT: HCPCS | Performed by: INTERNAL MEDICINE

## 2025-03-31 PROCEDURE — G8427 DOCREV CUR MEDS BY ELIG CLIN: HCPCS | Performed by: INTERNAL MEDICINE

## 2025-03-31 PROCEDURE — 3017F COLORECTAL CA SCREEN DOC REV: CPT | Performed by: INTERNAL MEDICINE

## 2025-03-31 PROCEDURE — 99214 OFFICE O/P EST MOD 30 MIN: CPT | Performed by: INTERNAL MEDICINE

## 2025-03-31 RX ORDER — DOXYCYCLINE HYCLATE 100 MG
100 TABLET ORAL 2 TIMES DAILY
Qty: 20 TABLET | Refills: 0 | Status: SHIPPED | OUTPATIENT
Start: 2025-03-31 | End: 2025-04-10

## 2025-03-31 RX ORDER — VALACYCLOVIR HYDROCHLORIDE 1 G/1
1000 TABLET, FILM COATED ORAL 2 TIMES DAILY
Qty: 20 TABLET | Refills: 0 | Status: SHIPPED | OUTPATIENT
Start: 2025-03-31 | End: 2025-04-10

## 2025-03-31 SDOH — ECONOMIC STABILITY: FOOD INSECURITY: WITHIN THE PAST 12 MONTHS, YOU WORRIED THAT YOUR FOOD WOULD RUN OUT BEFORE YOU GOT MONEY TO BUY MORE.: NEVER TRUE

## 2025-03-31 SDOH — ECONOMIC STABILITY: FOOD INSECURITY: WITHIN THE PAST 12 MONTHS, THE FOOD YOU BOUGHT JUST DIDN'T LAST AND YOU DIDN'T HAVE MONEY TO GET MORE.: NEVER TRUE

## 2025-03-31 ASSESSMENT — PATIENT HEALTH QUESTIONNAIRE - PHQ9
8. MOVING OR SPEAKING SO SLOWLY THAT OTHER PEOPLE COULD HAVE NOTICED. OR THE OPPOSITE, BEING SO FIGETY OR RESTLESS THAT YOU HAVE BEEN MOVING AROUND A LOT MORE THAN USUAL: NOT AT ALL
2. FEELING DOWN, DEPRESSED OR HOPELESS: NOT AT ALL
SUM OF ALL RESPONSES TO PHQ QUESTIONS 1-9: 0
SUM OF ALL RESPONSES TO PHQ QUESTIONS 1-9: 0
9. THOUGHTS THAT YOU WOULD BE BETTER OFF DEAD, OR OF HURTING YOURSELF: NOT AT ALL
7. TROUBLE CONCENTRATING ON THINGS, SUCH AS READING THE NEWSPAPER OR WATCHING TELEVISION: NOT AT ALL
3. TROUBLE FALLING OR STAYING ASLEEP: NOT AT ALL
1. LITTLE INTEREST OR PLEASURE IN DOING THINGS: NOT AT ALL
SUM OF ALL RESPONSES TO PHQ QUESTIONS 1-9: 0
5. POOR APPETITE OR OVEREATING: NOT AT ALL
10. IF YOU CHECKED OFF ANY PROBLEMS, HOW DIFFICULT HAVE THESE PROBLEMS MADE IT FOR YOU TO DO YOUR WORK, TAKE CARE OF THINGS AT HOME, OR GET ALONG WITH OTHER PEOPLE: NOT DIFFICULT AT ALL
SUM OF ALL RESPONSES TO PHQ QUESTIONS 1-9: 0
6. FEELING BAD ABOUT YOURSELF - OR THAT YOU ARE A FAILURE OR HAVE LET YOURSELF OR YOUR FAMILY DOWN: NOT AT ALL
4. FEELING TIRED OR HAVING LITTLE ENERGY: NOT AT ALL

## 2025-03-31 NOTE — PROGRESS NOTES
present, and an EMG is needed.      Review of Systems   Constitutional:  Negative for appetite change, fatigue and fever.   HENT:  Negative for ear pain, postnasal drip, rhinorrhea, sneezing, sore throat and trouble swallowing.    Eyes: Negative.         No eye symptoms but overdue for eye exam for diabetes and chronic Plaquenil therapy   Respiratory:  Negative for cough, shortness of breath and wheezing.         Moderate persistent asthma with good control.   Cardiovascular: Negative.  Negative for chest pain and palpitations.        Essential hypertension   Gastrointestinal:  Positive for abdominal pain, blood in stool and hematochezia. Negative for abdominal distention, constipation, diarrhea, nausea, rectal pain and vomiting.   Endocrine: Negative for polydipsia, polyphagia and polyuria.        Type 2 diabetes non-insulin-requiring and acquired hypothyroidism, hyperlipidemia   Genitourinary: Negative.    Musculoskeletal: Negative.  Negative for myalgias and neck pain.   Skin:  Negative for pallor.   Neurological:  Negative for dizziness, tremors, seizures, speech difficulty, weakness and headaches.   Hematological: Negative.    Psychiatric/Behavioral:  Negative for confusion. The patient is not nervous/anxious.           Objective   Physical Exam  HENT:      Nose: Nose normal.      Mouth/Throat:      Mouth: Mucous membranes are moist.   Eyes:      Extraocular Movements: Extraocular movements intact.      Conjunctiva/sclera: Conjunctivae normal.      Pupils: Pupils are equal, round, and reactive to light.   Cardiovascular:      Pulses: Normal pulses.      Heart sounds: Normal heart sounds.   Pulmonary:      Effort: Pulmonary effort is normal.      Breath sounds: Normal breath sounds.   Abdominal:      General: Abdomen is flat. Bowel sounds are normal. There is no distension.      Palpations: Abdomen is soft. There is no mass.      Tenderness: There is abdominal tenderness. There is no right CVA tenderness, left

## 2025-04-03 ENCOUNTER — RESULTS FOLLOW-UP (OUTPATIENT)
Dept: PRIMARY CARE CLINIC | Age: 65
End: 2025-04-03

## 2025-04-07 ASSESSMENT — ENCOUNTER SYMPTOMS
COUGH: 0
RHINORRHEA: 0
SORE THROAT: 0
WHEEZING: 0
SHORTNESS OF BREATH: 0
TROUBLE SWALLOWING: 0
CONSTIPATION: 0
ABDOMINAL DISTENTION: 0
RECTAL PAIN: 0
NAUSEA: 0
DIARRHEA: 0
BLOOD IN STOOL: 1
VOMITING: 0
EYES NEGATIVE: 1
ABDOMINAL PAIN: 1

## 2025-06-10 DIAGNOSIS — M79.7 FIBROMYALGIA: ICD-10-CM

## 2025-06-10 RX ORDER — GABAPENTIN 300 MG/1
300 CAPSULE ORAL 2 TIMES DAILY
Qty: 180 CAPSULE | Refills: 1 | Status: SHIPPED | OUTPATIENT
Start: 2025-06-10 | End: 2025-12-07

## 2025-07-02 DIAGNOSIS — E78.00 PURE HYPERCHOLESTEROLEMIA: ICD-10-CM

## 2025-07-03 ENCOUNTER — TELEPHONE (OUTPATIENT)
Dept: ADMINISTRATIVE | Age: 65
End: 2025-07-03

## 2025-07-03 RX ORDER — ROSUVASTATIN CALCIUM 40 MG/1
40 TABLET, COATED ORAL NIGHTLY
Qty: 90 TABLET | Refills: 3 | Status: SHIPPED | OUTPATIENT
Start: 2025-07-03

## 2025-07-03 NOTE — TELEPHONE ENCOUNTER
Submitted PA for Rybelsus 70 mg , via Phone Call to Teri . Reports they need PA's be completed via portal at [email or web address]    Follow up done daily; if no response in three days we will refax for status check.  If another three days goes by with no response we will call the insurance for status.

## 2025-07-07 NOTE — TELEPHONE ENCOUNTER
The medication Rybelsus 7 mg  is APPROVED from 07/03/25 to 07/03/26.    Please notify the patient.    If this requires a response please respond to the pool ( [facility] MEDICATION PRE-AUTH).

## 2025-08-11 DIAGNOSIS — E11.9 TYPE 2 DIABETES MELLITUS WITHOUT COMPLICATION, WITHOUT LONG-TERM CURRENT USE OF INSULIN (HCC): ICD-10-CM

## 2025-08-11 RX ORDER — GLIPIZIDE 10 MG/1
TABLET, FILM COATED, EXTENDED RELEASE ORAL
Qty: 180 TABLET | Refills: 1 | Status: SHIPPED | OUTPATIENT
Start: 2025-08-11

## 2025-08-26 DIAGNOSIS — E11.8 TYPE 2 DIABETES MELLITUS WITH COMPLICATION, WITHOUT LONG-TERM CURRENT USE OF INSULIN (HCC): ICD-10-CM

## 2025-08-29 RX ORDER — ORAL SEMAGLUTIDE 7 MG/1
TABLET ORAL
Qty: 30 TABLET | Refills: 12 | Status: SHIPPED | OUTPATIENT
Start: 2025-08-29